# Patient Record
Sex: MALE | Race: WHITE | NOT HISPANIC OR LATINO | Employment: FULL TIME | URBAN - METROPOLITAN AREA
[De-identification: names, ages, dates, MRNs, and addresses within clinical notes are randomized per-mention and may not be internally consistent; named-entity substitution may affect disease eponyms.]

---

## 2023-03-08 PROBLEM — M51.369 LUMBAR DEGENERATIVE DISC DISEASE: Status: ACTIVE | Noted: 2023-03-08

## 2023-03-08 PROBLEM — I10 ESSENTIAL HYPERTENSION: Status: ACTIVE | Noted: 2023-03-08

## 2023-03-08 PROBLEM — R10.9 ABDOMINAL PAIN: Status: ACTIVE | Noted: 2023-03-08

## 2023-03-08 PROBLEM — S13.9XXA NECK SPRAIN: Status: ACTIVE | Noted: 2023-03-08

## 2023-03-08 PROBLEM — I20.9 ANGINA PECTORIS (CMS-HCC): Status: ACTIVE | Noted: 2023-03-08

## 2023-03-08 PROBLEM — N40.0 ENLARGED PROSTATE: Status: ACTIVE | Noted: 2023-03-08

## 2023-03-08 PROBLEM — R07.9 CHEST PAIN: Status: ACTIVE | Noted: 2023-03-08

## 2023-03-08 PROBLEM — M79.10 MYALGIA: Status: ACTIVE | Noted: 2023-03-08

## 2023-03-08 PROBLEM — L40.9 PSORIASIS: Status: ACTIVE | Noted: 2023-03-08

## 2023-03-08 PROBLEM — R73.02 IGT (IMPAIRED GLUCOSE TOLERANCE): Status: ACTIVE | Noted: 2023-03-08

## 2023-03-08 PROBLEM — S46.811A STRAIN OF RIGHT TRAPEZIUS MUSCLE: Status: ACTIVE | Noted: 2023-03-08

## 2023-03-08 PROBLEM — R09.89 LABILE HYPERTENSION: Status: ACTIVE | Noted: 2023-03-08

## 2023-03-08 PROBLEM — G56.01 SEVERE CARPAL TUNNEL SYNDROME OF RIGHT WRIST: Status: ACTIVE | Noted: 2023-03-08

## 2023-03-08 PROBLEM — I70.0 AORTIC ATHEROSCLEROSIS (CMS-HCC): Status: ACTIVE | Noted: 2023-03-08

## 2023-03-08 PROBLEM — M79.7 FIBROMYALGIA: Status: ACTIVE | Noted: 2023-03-08

## 2023-03-08 PROBLEM — I10 PAROXYSMAL HYPERTENSION: Status: ACTIVE | Noted: 2023-03-08

## 2023-03-08 PROBLEM — M54.12 RIGHT CERVICAL RADICULOPATHY: Status: ACTIVE | Noted: 2023-03-08

## 2023-03-08 PROBLEM — S16.1XXA CERVICAL MUSCLE STRAIN, INITIAL ENCOUNTER: Status: ACTIVE | Noted: 2023-03-08

## 2023-03-08 PROBLEM — M19.90 ARTHRITIS: Status: ACTIVE | Noted: 2023-03-08

## 2023-03-08 PROBLEM — H61.23 IMPACTED CERUMEN OF BOTH EARS: Status: ACTIVE | Noted: 2023-03-08

## 2023-03-08 PROBLEM — M77.12 LATERAL EPICONDYLITIS OF LEFT ELBOW: Status: ACTIVE | Noted: 2023-03-08

## 2023-03-08 PROBLEM — R80.9 PROTEINURIA: Status: ACTIVE | Noted: 2023-03-08

## 2023-03-08 PROBLEM — F41.9 ANXIETY: Status: ACTIVE | Noted: 2023-03-08

## 2023-03-08 PROBLEM — L03.011 CELLULITIS OF RIGHT MIDDLE FINGER: Status: ACTIVE | Noted: 2023-03-08

## 2023-03-08 PROBLEM — E55.9 VITAMIN D DEFICIENCY: Status: ACTIVE | Noted: 2023-03-08

## 2023-03-08 PROBLEM — M50.20 CERVICAL HERNIATED DISC: Status: ACTIVE | Noted: 2023-03-08

## 2023-03-08 PROBLEM — K62.5 BRIGHT RED BLOOD PER RECTUM: Status: ACTIVE | Noted: 2023-03-08

## 2023-03-08 PROBLEM — M50.90 CERVICAL DISC DISEASE: Status: ACTIVE | Noted: 2023-03-08

## 2023-03-08 PROBLEM — M47.812 CERVICAL SPONDYLOSIS: Status: ACTIVE | Noted: 2023-03-08

## 2023-03-08 PROBLEM — Z95.1 S/P CABG (CORONARY ARTERY BYPASS GRAFT): Status: ACTIVE | Noted: 2023-03-08

## 2023-03-08 PROBLEM — I25.10 CAD (CORONARY ARTERY DISEASE): Status: ACTIVE | Noted: 2023-03-08

## 2023-03-08 PROBLEM — R20.2 ARM PARESTHESIA, RIGHT: Status: ACTIVE | Noted: 2023-03-08

## 2023-03-08 PROBLEM — R91.8 LUNG NODULES: Status: ACTIVE | Noted: 2023-03-08

## 2023-03-08 PROBLEM — K21.9 GERD (GASTROESOPHAGEAL REFLUX DISEASE): Status: ACTIVE | Noted: 2023-03-08

## 2023-03-08 PROBLEM — H61.22 LEFT EAR IMPACTED CERUMEN: Status: ACTIVE | Noted: 2023-03-08

## 2023-03-08 PROBLEM — R73.9 HYPERGLYCEMIA: Status: ACTIVE | Noted: 2023-03-08

## 2023-03-08 PROBLEM — R79.89 ABNORMAL LFTS: Status: ACTIVE | Noted: 2023-03-08

## 2023-03-08 PROBLEM — M54.50 LOWER BACK PAIN: Status: ACTIVE | Noted: 2023-03-08

## 2023-03-08 PROBLEM — M51.36 LUMBAR DEGENERATIVE DISC DISEASE: Status: ACTIVE | Noted: 2023-03-08

## 2023-03-08 PROBLEM — R53.83 FATIGUE: Status: ACTIVE | Noted: 2023-03-08

## 2023-03-08 PROBLEM — M54.2 NECK PAIN: Status: ACTIVE | Noted: 2023-03-08

## 2023-03-08 PROBLEM — E78.5 HYPERLIPIDEMIA: Status: ACTIVE | Noted: 2023-03-08

## 2023-03-08 PROBLEM — I20.0 UNSTABLE ANGINA (MULTI): Status: ACTIVE | Noted: 2023-03-08

## 2023-03-08 PROBLEM — G47.33 OBSTRUCTIVE SLEEP APNEA: Status: ACTIVE | Noted: 2023-03-08

## 2023-03-08 PROBLEM — M62.838 TRAPEZIUS MUSCLE SPASM: Status: ACTIVE | Noted: 2023-03-08

## 2023-03-08 PROBLEM — M19.90 DJD (DEGENERATIVE JOINT DISEASE): Status: ACTIVE | Noted: 2023-03-08

## 2023-03-08 PROBLEM — M25.519 STERNOCLAVICULAR JOINT PAIN: Status: ACTIVE | Noted: 2023-03-08

## 2023-03-08 PROBLEM — M47.812 FACET ARTHROPATHY, CERVICAL: Status: ACTIVE | Noted: 2023-03-08

## 2023-03-08 PROBLEM — M51.34 THORACIC DEGENERATIVE DISC DISEASE: Status: ACTIVE | Noted: 2023-03-08

## 2023-03-08 PROBLEM — I65.29 CAROTID ARTERY STENOSIS: Status: ACTIVE | Noted: 2023-03-08

## 2023-03-08 PROBLEM — N52.9 ERECTILE DYSFUNCTION: Status: ACTIVE | Noted: 2023-03-08

## 2023-03-08 RX ORDER — ROSUVASTATIN CALCIUM 20 MG/1
1 TABLET, COATED ORAL NIGHTLY
COMMUNITY
Start: 2021-03-13 | End: 2024-05-01

## 2023-03-08 RX ORDER — AMLODIPINE BESYLATE 10 MG/1
1 TABLET ORAL DAILY
COMMUNITY
Start: 2021-03-03 | End: 2023-06-20 | Stop reason: ALTCHOICE

## 2023-03-08 RX ORDER — CLOPIDOGREL BISULFATE 75 MG/1
1 TABLET ORAL DAILY
COMMUNITY
Start: 2020-01-22

## 2023-03-08 RX ORDER — EZETIMIBE 10 MG/1
1 TABLET ORAL NIGHTLY
COMMUNITY
Start: 2020-04-07

## 2023-03-08 RX ORDER — NITROGLYCERIN 0.4 MG/1
TABLET SUBLINGUAL
COMMUNITY
Start: 2020-01-22 | End: 2024-05-22 | Stop reason: SDUPTHER

## 2023-03-08 RX ORDER — LISINOPRIL 40 MG/1
1 TABLET ORAL DAILY
COMMUNITY
Start: 2014-10-29 | End: 2023-11-13 | Stop reason: SDUPTHER

## 2023-03-08 RX ORDER — ALPRAZOLAM 0.25 MG/1
0.5 TABLET ORAL 3 TIMES DAILY PRN
COMMUNITY
Start: 2014-12-05 | End: 2023-05-08 | Stop reason: SDUPTHER

## 2023-03-08 RX ORDER — METOPROLOL SUCCINATE 50 MG/1
50 TABLET, EXTENDED RELEASE ORAL DAILY
COMMUNITY
End: 2023-06-20 | Stop reason: SDUPTHER

## 2023-03-13 ENCOUNTER — OFFICE VISIT (OUTPATIENT)
Dept: PRIMARY CARE | Facility: CLINIC | Age: 63
End: 2023-03-13
Payer: COMMERCIAL

## 2023-03-13 VITALS
RESPIRATION RATE: 16 BRPM | WEIGHT: 150 LBS | SYSTOLIC BLOOD PRESSURE: 160 MMHG | BODY MASS INDEX: 22.81 KG/M2 | HEART RATE: 58 BPM | DIASTOLIC BLOOD PRESSURE: 88 MMHG

## 2023-03-13 DIAGNOSIS — E78.2 MIXED HYPERLIPIDEMIA: ICD-10-CM

## 2023-03-13 DIAGNOSIS — I65.01 STENOSIS OF RIGHT VERTEBRAL ARTERY: ICD-10-CM

## 2023-03-13 DIAGNOSIS — I25.10 CORONARY ARTERY DISEASE INVOLVING NATIVE CORONARY ARTERY OF NATIVE HEART WITHOUT ANGINA PECTORIS: ICD-10-CM

## 2023-03-13 DIAGNOSIS — R73.02 IGT (IMPAIRED GLUCOSE TOLERANCE): ICD-10-CM

## 2023-03-13 DIAGNOSIS — I10 ESSENTIAL HYPERTENSION: Primary | ICD-10-CM

## 2023-03-13 DIAGNOSIS — F41.9 ANXIETY: ICD-10-CM

## 2023-03-13 DIAGNOSIS — L40.9 PSORIASIS: ICD-10-CM

## 2023-03-13 LAB
APPEARANCE, URINE: ABNORMAL
BILIRUBIN, URINE: NEGATIVE
BLOOD, URINE: NEGATIVE
COLOR, URINE: YELLOW
GLUCOSE, URINE: NEGATIVE MG/DL
KETONES, URINE: NEGATIVE MG/DL
LEUKOCYTE ESTERASE, URINE: NEGATIVE
MUCUS, URINE: NORMAL /LPF
NITRITE, URINE: NEGATIVE
PH, URINE: 6 (ref 5–8)
PROTEIN, URINE: ABNORMAL MG/DL
RBC, URINE: 2 /HPF (ref 0–5)
SPECIFIC GRAVITY, URINE: 1.01 (ref 1–1.03)
SQUAMOUS EPITHELIAL CELLS, URINE: <1 /HPF
UROBILINOGEN, URINE: <2 MG/DL (ref 0–1.9)
WBC, URINE: <1 /HPF (ref 0–5)

## 2023-03-13 PROCEDURE — 81001 URINALYSIS AUTO W/SCOPE: CPT

## 2023-03-13 PROCEDURE — 3077F SYST BP >= 140 MM HG: CPT | Performed by: INTERNAL MEDICINE

## 2023-03-13 PROCEDURE — 99214 OFFICE O/P EST MOD 30 MIN: CPT | Performed by: INTERNAL MEDICINE

## 2023-03-13 PROCEDURE — 3079F DIAST BP 80-89 MM HG: CPT | Performed by: INTERNAL MEDICINE

## 2023-03-13 RX ORDER — HYDROCHLOROTHIAZIDE 25 MG/1
25 TABLET ORAL DAILY
Qty: 30 TABLET | Refills: 11 | Status: SHIPPED | OUTPATIENT
Start: 2023-03-13 | End: 2024-05-01 | Stop reason: HOSPADM

## 2023-03-13 ASSESSMENT — ENCOUNTER SYMPTOMS
NEUROLOGICAL NEGATIVE: 1
RESPIRATORY NEGATIVE: 1
CONSTITUTIONAL NEGATIVE: 1
EYES NEGATIVE: 1
MUSCULOSKELETAL NEGATIVE: 1
CARDIOVASCULAR NEGATIVE: 1
GASTROINTESTINAL NEGATIVE: 1
NERVOUS/ANXIOUS: 1

## 2023-03-13 NOTE — PROGRESS NOTES
Subjective   Patient ID: Kennedy Jama is a 63 y.o. male who presents for No chief complaint on file..    HPI     OARRS:  Matheus Mcpherson MD on 3/13/2023 10:43 AM  I have personally reviewed the OARRS report for Kennedy Jama. I have considered the risks of abuse, dependence, addiction and diversion    Is the patient prescribed a combination of a benzodiazepine and opioid?  No    Last Urine Drug Screen / ordered today: Yes  Recent Results (from the past 06776 hour(s))   Drug Screen, Urine With Reflex to Confirmation    Collection Time: 01/13/22  3:42 PM   Result Value Ref Range    DRUG SCREEN COMMENT URINE SEE BELOW     Amphetamine Screen, Urine PRESUMPTIVE NEGATIVE NEGATIVE    Barbiturate Screen, Urine PRESUMPTIVE NEGATIVE NEGATIVE    BENZODIAZEPINE (PRESENCE) IN URINE BY SCREEN METHOD PRESUMPTIVE NEGATIVE NEGATIVE    Cannabinoid Screen, Urine PRESUMPTIVE NEGATIVE NEGATIVE    Cocaine Screen, Urine PRESUMPTIVE NEGATIVE NEGATIVE    Fentanyl, Ur PRESUMPTIVE NEGATIVE NEGATIVE    Methadone Screen, Urine PRESUMPTIVE NEGATIVE NEGATIVE    Opiate Screen, Urine PRESUMPTIVE NEGATIVE NEGATIVE    Oxycodone Screen, Ur PRESUMPTIVE NEGATIVE NEGATIVE    PCP Screen, Urine PRESUMPTIVE NEGATIVE NEGATIVE     Results are as expected.     Controlled Substance Agreement:  Date of the Last Agreement: 3/13/23  Reviewed Controlled Substance Agreement including but not limited to the benefits, risks, and alternatives to treatment with a Controlled Substance medication(s).    Benzodiazepines:  What is the patient's goal of therapy? Control anxiety  Is this being achieved with current treatment? yes    RAMON-7:  No data recorded    Activities of Daily Living:   Is your overall impression that this patient is benefiting (symptom reduction outweighs side effects) from benzodiazepine therapy? Yes     1. Physical Functioning: Same  2. Family Relationship: Same  3. Social Relationship: Same  4. Mood: Same  5. Sleep Patterns: Same  6. Overall  Function: Same    Review of Systems   Constitutional: Negative.    HENT: Negative.     Eyes: Negative.    Respiratory: Negative.     Cardiovascular: Negative.    Gastrointestinal: Negative.    Genitourinary: Negative.    Musculoskeletal: Negative.    Skin: Negative.    Neurological: Negative.    Psychiatric/Behavioral:  The patient is nervous/anxious.        Objective   /88   Pulse 58   Resp 16   Wt 68 kg (150 lb)   BMI 22.81 kg/m²     Physical Exam  Constitutional:       Appearance: Normal appearance.   HENT:      Head: Normocephalic.      Nose: Nose normal.   Cardiovascular:      Rate and Rhythm: Normal rate and regular rhythm.      Pulses: Normal pulses.      Heart sounds: Normal heart sounds.   Pulmonary:      Effort: Pulmonary effort is normal.      Breath sounds: Normal breath sounds.   Abdominal:      Palpations: Abdomen is soft.   Musculoskeletal:         General: Normal range of motion.      Cervical back: Normal range of motion.      Right lower leg: No edema.      Left lower leg: No edema.   Skin:     General: Skin is warm and dry.   Neurological:      General: No focal deficit present.      Mental Status: He is alert and oriented to person, place, and time. Mental status is at baseline.   Psychiatric:         Mood and Affect: Mood normal.         Behavior: Behavior normal.         Thought Content: Thought content normal.         Judgment: Judgment normal.         Assessment/Plan   Diagnoses and all orders for this visit:  Essential hypertension  -     Basic Metabolic Panel; Future  -     CBC; Future  -     Urinalysis with Reflex Microscopic; Future  -     hydroCHLOROthiazide (HYDRODiuril) 25 mg tablet; Take 1 tablet (25 mg) by mouth once daily.  Anxiety  -     Drug Screen, Urine With Reflex to Confirmation; Future  IGT (impaired glucose tolerance)  -     Hemoglobin A1C; Future  -     Urinalysis with Reflex Microscopic  Mixed hyperlipidemia  -     Alanine Aminotransferase; Future  -      Aspartate Aminotransferase; Future  -     Lipid Panel; Future  Coronary artery disease involving native coronary artery of native heart without angina pectoris  Psoriasis  -     Referral to Dermatology; Future  Stenosis of right vertebral artery  -     Referral to Vascular Surgery; Future

## 2023-03-14 NOTE — PROGRESS NOTES
HPI    HTN, dyslipidemia, IGT, anxiety, CAD post CABG.  Comes in f/u.  Due for labs.  Needs drug screen & contract done.  He had a carotid U/S done w/cardiology in November.  Carotids were clear, there was some narrowing at the bottom of the R vertebral artery, asymptomatic.  He's got psoriasis R knee & L elbow & wanted to see dermatology.  Occasionally very subtle L flank pain, pretty minor.  Bowels are fine, urination OK.  No SOB or chest pain.  Otherwise feels fine w/out any real issues or concerns.  He had stress testing done w/his cardiologist that came out fine in November as well.  Sxs & conditions moderate in severity, stable controlled over the past 4 months.     Assessessment/Plan  Diagnoses and all orders for this visit:  Essential hypertension  -     Basic Metabolic Panel; Future  -     CBC; Future  -     Urinalysis with Reflex Microscopic; Future  -     hydroCHLOROthiazide (HYDRODiuril) 25 mg tablet; Take 1 tablet (25 mg) by mouth once daily.  Anxiety  -     Drug Screen, Urine With Reflex to Confirmation; Future  IGT (impaired glucose tolerance)  -     Hemoglobin A1C; Future  -     Urinalysis with Reflex Microscopic  -     Urinalysis Microscopic Only  Mixed hyperlipidemia  -     Alanine Aminotransferase; Future  -     Aspartate Aminotransferase; Future  -     Lipid Panel; Future  Coronary artery disease involving native coronary artery of native heart without angina pectoris  Psoriasis  -     Referral to Dermatology; Future  Stenosis of right vertebral artery  -     Referral to Vascular Surgery; Future     HTN, dyslipidemia, IGT, anxiety, CAD post CABG.  Medically doing fine.  I had him sign a contract for Xanax.  We'll do drug screen pain management protocol.   He has enough of the Xanax, contact me when he needs a refill.  BP above goal, we'll add HCTZ 25mg daily.  We'll check his labs, Hb A1C & lipids.  We'll get a UA w/that little bit of flank pain, pretty minor, so long as the urine is clear I  would just observe.  If there's blood in the urine then we'll work it up. We'll have him see dermatology w/the psoriasis.  We'll have him see vascular w/this questionable R vertebral artery narrowing.  I'll see him in 3 months for a physical.  Continue care w/cardiology.      Scribe Attestation  By signing my name below, I, Burak Ahmadi   attest that this documentation has been prepared under the direction and in the presence of Matheus Mcpherson MD.

## 2023-04-26 ENCOUNTER — TELEPHONE (OUTPATIENT)
Dept: PRIMARY CARE | Facility: CLINIC | Age: 63
End: 2023-04-26
Payer: COMMERCIAL

## 2023-05-08 ENCOUNTER — TELEPHONE (OUTPATIENT)
Dept: PRIMARY CARE | Facility: CLINIC | Age: 63
End: 2023-05-08
Payer: COMMERCIAL

## 2023-05-08 DIAGNOSIS — F41.9 ANXIETY: ICD-10-CM

## 2023-05-08 NOTE — TELEPHONE ENCOUNTER
Patient left another vm today, regarding his Xanax.   LOV:3/13/23  Contract:3/13/23  Drug Screen:3/13/23  OARRS:10/28/22

## 2023-05-09 RX ORDER — ALPRAZOLAM 0.25 MG/1
0.12 TABLET ORAL 3 TIMES DAILY PRN
Qty: 42 TABLET | Refills: 0 | Status: SHIPPED | OUTPATIENT
Start: 2023-05-09

## 2023-06-20 ENCOUNTER — OFFICE VISIT (OUTPATIENT)
Dept: PRIMARY CARE | Facility: CLINIC | Age: 63
End: 2023-06-20
Payer: COMMERCIAL

## 2023-06-20 ENCOUNTER — APPOINTMENT (OUTPATIENT)
Dept: PRIMARY CARE | Facility: CLINIC | Age: 63
End: 2023-06-20
Payer: COMMERCIAL

## 2023-06-20 ENCOUNTER — LAB (OUTPATIENT)
Dept: LAB | Facility: LAB | Age: 63
End: 2023-06-20
Payer: COMMERCIAL

## 2023-06-20 VITALS
HEIGHT: 68 IN | WEIGHT: 153.4 LBS | HEART RATE: 57 BPM | SYSTOLIC BLOOD PRESSURE: 129 MMHG | RESPIRATION RATE: 16 BRPM | BODY MASS INDEX: 23.25 KG/M2 | DIASTOLIC BLOOD PRESSURE: 76 MMHG | OXYGEN SATURATION: 97 %

## 2023-06-20 DIAGNOSIS — Z12.5 PROSTATE CANCER SCREENING: ICD-10-CM

## 2023-06-20 DIAGNOSIS — R73.9 HYPERGLYCEMIA: ICD-10-CM

## 2023-06-20 DIAGNOSIS — I25.10 CORONARY ARTERY DISEASE INVOLVING NATIVE CORONARY ARTERY OF NATIVE HEART, UNSPECIFIED WHETHER ANGINA PRESENT: ICD-10-CM

## 2023-06-20 DIAGNOSIS — R80.9 PROTEINURIA, UNSPECIFIED TYPE: ICD-10-CM

## 2023-06-20 DIAGNOSIS — I10 ESSENTIAL HYPERTENSION: ICD-10-CM

## 2023-06-20 DIAGNOSIS — Z12.11 COLON CANCER SCREENING: ICD-10-CM

## 2023-06-20 DIAGNOSIS — Z00.00 ANNUAL PHYSICAL EXAM: Primary | ICD-10-CM

## 2023-06-20 DIAGNOSIS — G47.00 INSOMNIA, UNSPECIFIED TYPE: ICD-10-CM

## 2023-06-20 DIAGNOSIS — E78.5 HYPERLIPIDEMIA, UNSPECIFIED HYPERLIPIDEMIA TYPE: ICD-10-CM

## 2023-06-20 DIAGNOSIS — Z23 ENCOUNTER FOR IMMUNIZATION: ICD-10-CM

## 2023-06-20 DIAGNOSIS — F41.9 ANXIETY: ICD-10-CM

## 2023-06-20 PROBLEM — M62.838 TRAPEZIUS MUSCLE SPASM: Status: RESOLVED | Noted: 2023-03-08 | Resolved: 2023-06-20

## 2023-06-20 PROBLEM — M19.90 DJD (DEGENERATIVE JOINT DISEASE): Status: RESOLVED | Noted: 2023-03-08 | Resolved: 2023-06-20

## 2023-06-20 PROBLEM — S46.811A STRAIN OF RIGHT TRAPEZIUS MUSCLE: Status: RESOLVED | Noted: 2023-03-08 | Resolved: 2023-06-20

## 2023-06-20 PROBLEM — R07.9 CHEST PAIN: Status: RESOLVED | Noted: 2023-03-08 | Resolved: 2023-06-20

## 2023-06-20 PROBLEM — M79.10 MYALGIA: Status: RESOLVED | Noted: 2023-03-08 | Resolved: 2023-06-20

## 2023-06-20 PROBLEM — K62.5 BRIGHT RED BLOOD PER RECTUM: Status: RESOLVED | Noted: 2023-03-08 | Resolved: 2023-06-20

## 2023-06-20 PROBLEM — S16.1XXA CERVICAL MUSCLE STRAIN, INITIAL ENCOUNTER: Status: RESOLVED | Noted: 2023-03-08 | Resolved: 2023-06-20

## 2023-06-20 PROBLEM — H61.22 LEFT EAR IMPACTED CERUMEN: Status: RESOLVED | Noted: 2023-03-08 | Resolved: 2023-06-20

## 2023-06-20 PROBLEM — S13.9XXA NECK SPRAIN: Status: RESOLVED | Noted: 2023-03-08 | Resolved: 2023-06-20

## 2023-06-20 PROBLEM — L03.011 CELLULITIS OF RIGHT MIDDLE FINGER: Status: RESOLVED | Noted: 2023-03-08 | Resolved: 2023-06-20

## 2023-06-20 PROBLEM — M77.12 LATERAL EPICONDYLITIS OF LEFT ELBOW: Status: RESOLVED | Noted: 2023-03-08 | Resolved: 2023-06-20

## 2023-06-20 PROBLEM — H61.23 IMPACTED CERUMEN OF BOTH EARS: Status: RESOLVED | Noted: 2023-03-08 | Resolved: 2023-06-20

## 2023-06-20 PROBLEM — M54.50 LOWER BACK PAIN: Status: RESOLVED | Noted: 2023-03-08 | Resolved: 2023-06-20

## 2023-06-20 PROBLEM — R73.02 IGT (IMPAIRED GLUCOSE TOLERANCE): Status: RESOLVED | Noted: 2023-03-08 | Resolved: 2023-06-20

## 2023-06-20 PROBLEM — M50.90 CERVICAL DISC DISEASE: Status: RESOLVED | Noted: 2023-03-08 | Resolved: 2023-06-20

## 2023-06-20 PROBLEM — R10.9 ABDOMINAL PAIN: Status: RESOLVED | Noted: 2023-03-08 | Resolved: 2023-06-20

## 2023-06-20 LAB
ALANINE AMINOTRANSFERASE (SGPT) (U/L) IN SER/PLAS: 32 U/L (ref 10–52)
ALBUMIN (MG/L) IN URINE: 106.2 MG/L
ALBUMIN/CREATININE (UG/MG) IN URINE: 293.4 UG/MG CRT (ref 0–30)
ANION GAP IN SER/PLAS: 15 MMOL/L (ref 10–20)
ASPARTATE AMINOTRANSFERASE (SGOT) (U/L) IN SER/PLAS: 24 U/L (ref 9–39)
CALCIUM (MG/DL) IN SER/PLAS: 10.4 MG/DL (ref 8.6–10.6)
CARBON DIOXIDE, TOTAL (MMOL/L) IN SER/PLAS: 28 MMOL/L (ref 21–32)
CHLORIDE (MMOL/L) IN SER/PLAS: 100 MMOL/L (ref 98–107)
CHOLESTEROL (MG/DL) IN SER/PLAS: 129 MG/DL (ref 0–199)
CHOLESTEROL IN HDL (MG/DL) IN SER/PLAS: 39.7 MG/DL
CHOLESTEROL/HDL RATIO: 3.2
CREATININE (MG/DL) IN SER/PLAS: 1.26 MG/DL (ref 0.5–1.3)
CREATININE (MG/DL) IN URINE: 36.2 MG/DL (ref 20–370)
ERYTHROCYTE DISTRIBUTION WIDTH (RATIO) BY AUTOMATED COUNT: 12.5 % (ref 11.5–14.5)
ERYTHROCYTE MEAN CORPUSCULAR HEMOGLOBIN CONCENTRATION (G/DL) BY AUTOMATED: 34.3 G/DL (ref 32–36)
ERYTHROCYTE MEAN CORPUSCULAR VOLUME (FL) BY AUTOMATED COUNT: 83 FL (ref 80–100)
ERYTHROCYTES (10*6/UL) IN BLOOD BY AUTOMATED COUNT: 5.56 X10E12/L (ref 4.5–5.9)
ESTIMATED AVERAGE GLUCOSE FOR HBA1C: 120 MG/DL
GFR MALE: 64 ML/MIN/1.73M2
GLUCOSE (MG/DL) IN SER/PLAS: 109 MG/DL (ref 74–99)
HEMATOCRIT (%) IN BLOOD BY AUTOMATED COUNT: 46.1 % (ref 41–52)
HEMOGLOBIN (G/DL) IN BLOOD: 15.8 G/DL (ref 13.5–17.5)
HEMOGLOBIN A1C/HEMOGLOBIN TOTAL IN BLOOD: 5.8 %
LDL: 68 MG/DL (ref 0–99)
LEUKOCYTES (10*3/UL) IN BLOOD BY AUTOMATED COUNT: 8.1 X10E9/L (ref 4.4–11.3)
NRBC (PER 100 WBCS) BY AUTOMATED COUNT: 0 /100 WBC (ref 0–0)
PLATELETS (10*3/UL) IN BLOOD AUTOMATED COUNT: 183 X10E9/L (ref 150–450)
POTASSIUM (MMOL/L) IN SER/PLAS: 4.1 MMOL/L (ref 3.5–5.3)
PROSTATE SPECIFIC ANTIGEN,SCREEN: 0.58 NG/ML (ref 0–4)
SODIUM (MMOL/L) IN SER/PLAS: 139 MMOL/L (ref 136–145)
TRIGLYCERIDE (MG/DL) IN SER/PLAS: 106 MG/DL (ref 0–149)
UREA NITROGEN (MG/DL) IN SER/PLAS: 29 MG/DL (ref 6–23)
VLDL: 21 MG/DL (ref 0–40)

## 2023-06-20 PROCEDURE — 3074F SYST BP LT 130 MM HG: CPT | Performed by: FAMILY MEDICINE

## 2023-06-20 PROCEDURE — 84450 TRANSFERASE (AST) (SGOT): CPT

## 2023-06-20 PROCEDURE — 90750 HZV VACC RECOMBINANT IM: CPT | Performed by: FAMILY MEDICINE

## 2023-06-20 PROCEDURE — 3078F DIAST BP <80 MM HG: CPT | Performed by: FAMILY MEDICINE

## 2023-06-20 PROCEDURE — 85027 COMPLETE CBC AUTOMATED: CPT

## 2023-06-20 PROCEDURE — 82043 UR ALBUMIN QUANTITATIVE: CPT

## 2023-06-20 PROCEDURE — 80061 LIPID PANEL: CPT

## 2023-06-20 PROCEDURE — 84153 ASSAY OF PSA TOTAL: CPT

## 2023-06-20 PROCEDURE — 83036 HEMOGLOBIN GLYCOSYLATED A1C: CPT

## 2023-06-20 PROCEDURE — 99396 PREV VISIT EST AGE 40-64: CPT | Performed by: FAMILY MEDICINE

## 2023-06-20 PROCEDURE — 36415 COLL VENOUS BLD VENIPUNCTURE: CPT

## 2023-06-20 PROCEDURE — 84460 ALANINE AMINO (ALT) (SGPT): CPT

## 2023-06-20 PROCEDURE — 1036F TOBACCO NON-USER: CPT | Performed by: FAMILY MEDICINE

## 2023-06-20 PROCEDURE — 82570 ASSAY OF URINE CREATININE: CPT

## 2023-06-20 PROCEDURE — 80048 BASIC METABOLIC PNL TOTAL CA: CPT

## 2023-06-20 PROCEDURE — 90471 IMMUNIZATION ADMIN: CPT | Performed by: FAMILY MEDICINE

## 2023-06-20 RX ORDER — METOPROLOL SUCCINATE 50 MG/1
50 TABLET, EXTENDED RELEASE ORAL DAILY
Qty: 90 TABLET | Refills: 1 | Status: SHIPPED | OUTPATIENT
Start: 2023-06-20 | End: 2024-03-21 | Stop reason: SDUPTHER

## 2023-06-20 ASSESSMENT — PATIENT HEALTH QUESTIONNAIRE - PHQ9
SUM OF ALL RESPONSES TO PHQ9 QUESTIONS 1 AND 2: 0
2. FEELING DOWN, DEPRESSED OR HOPELESS: NOT AT ALL
1. LITTLE INTEREST OR PLEASURE IN DOING THINGS: NOT AT ALL

## 2023-06-20 NOTE — PATIENT INSTRUCTIONS
Fasting labs.  Colonoscopy referral.  Refilled Metoprolol.  Did not refill Alprazolam.  Referred to psychiatry for anxiety and sleep medicine for insomnia.  Follow up with cardiology as directed.  Shingrix #1 provided (return in 2-6 months for shingrix #2).    F/U 6 months: Med refills, Shingrix #2 if not yet provided.

## 2023-06-20 NOTE — PROGRESS NOTES
"Subjective   Patient ID: Kennedy Jama is a 63 y.o. male who presents for Annual Exam.    HPI   Initial visit.    Patient's health is described as alright .  Regular dental visits: Yes.  Dental hygiene (brushing/flossing) regularly performed Yes.  Vision problems: Yes.  Corrective lenses: Yes.  Last eye exam within 1 year: No.  Hearing loss: No.  Requests audiology referral: No.  Immunizations up to date: No.  Healthy diet: Yes.  Regular exercise: No (active at work).  Trying to lose weight: No.  Requests nutrition/weight loss referral: No.  Sexually active: No.  Using contraception: N/A.  Requests STD screening: No.  Colon cancer screening up to date: No.    H/O CAD.  Prior PCP provided Metoprolol.  Requests refill.  Other meds for CAD provided by cardiology.    H/O Anxiety, Insomnia.  Takes Xanax during the day for anxiety, and at night for insomnia.  Xanax provided by prior PCP.  Not seeing psych.  Has not tried anything else for sleep.    Review of Systems   All other systems reviewed and are negative.    Objective   /76   Pulse 57   Resp 16   Ht 1.727 m (5' 8\")   Wt 69.6 kg (153 lb 6.4 oz)   SpO2 97%   BMI 23.32 kg/m²     Physical Exam  Constitutional:       General: He is not in acute distress.     Appearance: He is normal weight.   HENT:      Head: Normocephalic.      Right Ear: Tympanic membrane normal.      Left Ear: Tympanic membrane normal.      Mouth/Throat:      Pharynx: Oropharynx is clear. No oropharyngeal exudate or posterior oropharyngeal erythema.   Eyes:      Extraocular Movements: Extraocular movements intact.      Conjunctiva/sclera: Conjunctivae normal.      Pupils: Pupils are equal, round, and reactive to light.   Neck:      Thyroid: No thyromegaly.      Vascular: No carotid bruit.   Cardiovascular:      Rate and Rhythm: Normal rate and regular rhythm.      Heart sounds: Normal heart sounds. No murmur heard.     No friction rub. No gallop.   Pulmonary:      Effort: Pulmonary " effort is normal.      Breath sounds: Normal breath sounds. No wheezing, rhonchi or rales.   Abdominal:      General: Bowel sounds are normal. There is no distension.      Palpations: Abdomen is soft. There is no mass.      Tenderness: There is no abdominal tenderness. There is no guarding or rebound.   Genitourinary:     Comments: Declined prostate exam.  Lymphadenopathy:      Cervical: No cervical adenopathy.   Skin:     Coloration: Skin is not jaundiced or pale.   Neurological:      General: No focal deficit present.      Mental Status: He is oriented to person, place, and time.   Psychiatric:         Mood and Affect: Mood normal.         Behavior: Behavior normal.     Assessment/Plan   Diagnoses and all orders for this visit:  Annual physical exam  Encounter for immunization  -     Zoster vaccine, recombinant, adult (SHINGRIX)  Coronary artery disease involving native coronary artery of native heart, unspecified whether angina present  -     Lipid Panel; Future  -     Alanine Aminotransferase; Future  -     Aspartate Aminotransferase; Future  -     metoprolol succinate XL (Toprol-XL) 50 mg 24 hr tablet; Take 1 tablet (50 mg) by mouth once daily. For CAD  -     Tx by cardiology (provides Plavix)  Essential hypertension  -     CBC; Future  -     Basic Metabolic Panel; Future  -     Tx by cardiology (provides Lisinopril)  Anxiety  -     Referral to Psychiatry; Future  Insomnia, unspecified type  -     Referral to Adult Sleep Medicine; Future  Hyperglycemia  -     Hemoglobin A1C; Future  Hyperlipidemia, unspecified hyperlipidemia type  -     Lipid Panel; Future  -     Alanine Aminotransferase; Future  -     Aspartate Aminotransferase; Future  -     Tx by cardiology (provides Crestor, Zetia)  Proteinuria, unspecified type  -     Albumin , Urine Random; Future  Prostate cancer screening  -     Prostate Specific Antigen, Screen; Future  Colon cancer screening  -     Colonoscopy; Future    Fasting labs.  Colonoscopy  referral.  Refilled Metoprolol.  Did not refill Alprazolam.  Referred to psychiatry for anxiety and sleep medicine for insomnia.  Follow up with cardiology as directed.  Shingrix #1 provided (return in 2-6 months for shingrix #2).    F/U 6 months: Med refills, Shingrix #2 if not yet provided.

## 2023-07-04 DIAGNOSIS — R73.03 PREDIABETES: ICD-10-CM

## 2023-07-04 DIAGNOSIS — R80.9 MICROALBUMINURIA: Primary | ICD-10-CM

## 2023-11-13 DIAGNOSIS — I10 HYPERTENSION: Primary | ICD-10-CM

## 2023-11-13 RX ORDER — LISINOPRIL 40 MG/1
40 TABLET ORAL DAILY
Qty: 90 TABLET | Refills: 3 | Status: SHIPPED | OUTPATIENT
Start: 2023-11-13 | End: 2024-11-12

## 2023-12-19 ENCOUNTER — OFFICE VISIT (OUTPATIENT)
Dept: CARDIOLOGY | Facility: HOSPITAL | Age: 63
End: 2023-12-19
Payer: COMMERCIAL

## 2023-12-19 VITALS
HEIGHT: 68 IN | BODY MASS INDEX: 23.64 KG/M2 | HEART RATE: 56 BPM | SYSTOLIC BLOOD PRESSURE: 135 MMHG | DIASTOLIC BLOOD PRESSURE: 70 MMHG | WEIGHT: 156 LBS

## 2023-12-19 DIAGNOSIS — I25.10 CORONARY ARTERY DISEASE INVOLVING NATIVE CORONARY ARTERY OF NATIVE HEART, UNSPECIFIED WHETHER ANGINA PRESENT: Primary | ICD-10-CM

## 2023-12-19 PROCEDURE — 93005 ELECTROCARDIOGRAM TRACING: CPT | Performed by: INTERNAL MEDICINE

## 2023-12-19 PROCEDURE — 3075F SYST BP GE 130 - 139MM HG: CPT | Performed by: INTERNAL MEDICINE

## 2023-12-19 PROCEDURE — 99214 OFFICE O/P EST MOD 30 MIN: CPT | Performed by: INTERNAL MEDICINE

## 2023-12-19 PROCEDURE — 93010 ELECTROCARDIOGRAM REPORT: CPT | Performed by: INTERNAL MEDICINE

## 2023-12-19 PROCEDURE — 99214 OFFICE O/P EST MOD 30 MIN: CPT | Mod: 25 | Performed by: INTERNAL MEDICINE

## 2023-12-19 PROCEDURE — 3078F DIAST BP <80 MM HG: CPT | Performed by: INTERNAL MEDICINE

## 2023-12-19 PROCEDURE — 1036F TOBACCO NON-USER: CPT | Performed by: INTERNAL MEDICINE

## 2023-12-19 ASSESSMENT — ENCOUNTER SYMPTOMS
LOSS OF SENSATION IN FEET: 0
OCCASIONAL FEELINGS OF UNSTEADINESS: 0
DEPRESSION: 0

## 2023-12-19 NOTE — PROGRESS NOTES
Subjective:  Patient returns for a routine follow-up.  He is a pleasant gentleman with extensive coronary disease status post CABG.  He also has had multiple stent procedures on multiple vein grafts.  He generally appears to be doing reasonably well but is concerned as he feels he may be getting somewhat more exertional dyspnea and chest discomfort.  He does appear to be getting several episodes a week.  He appears somewhat anxious about this.  He has not had any hospitalizations and denies any other new health concerns.  He is taking all of his medications compliantly and is tolerating them well.    Objective:  General: Alert, usual pleasant self.  HEENT: Unchanged.  Lungs: Clear without crackles or wheezing.  Cardiac: Normal S1 and S2 with faint systolic murmur.  Abdomen: Nontender.  Extremities: No edema.  Skin: No rash.  Neuro: Grossly intact.    EKG: Normal sinus rhythm.  Inferior infarct.  Slow R wave progression.  No acute changes.    Lipid panel: Cholesterol-129, HDL-40, LDL-68, TG-106.    Impression/plan:  Patient presents with some potentially increasing anginal type symptomatology.  He is appropriately concerned about this.  His heart rate and blood pressure remain under very good control.  He remains compliant with his dual antiplatelet therapy.  His lipid panel looks quite good on combination therapy.  I elected to continue his current medications unchanged.  I will plan on getting him set up for a regular stress test after the first of the year and will compare this to his prior studies to see if there is any evidence of progressive ischemic disease.  Should this be the case, I do think we will be compelled to jump into a repeat catheterization and potential intervention.  He knows to call for any abrupt escalation of his symptomatology.  He did not need any prescriptions renewed.    Patient instructions:    Continue current medications unchanged.    Report for your stress test in January when  scheduled.    Return to clinic in 3 months.

## 2023-12-20 LAB
ATRIAL RATE: 56 BPM
P AXIS: 37 DEGREES
P OFFSET: 197 MS
P ONSET: 145 MS
PR INTERVAL: 148 MS
Q ONSET: 219 MS
QRS COUNT: 10 BEATS
QRS DURATION: 86 MS
QT INTERVAL: 442 MS
QTC CALCULATION(BAZETT): 426 MS
QTC FREDERICIA: 432 MS
R AXIS: -33 DEGREES
T AXIS: 29 DEGREES
T OFFSET: 440 MS
VENTRICULAR RATE: 56 BPM

## 2024-03-12 DIAGNOSIS — I10 ESSENTIAL HYPERTENSION: Primary | ICD-10-CM

## 2024-03-13 ENCOUNTER — APPOINTMENT (OUTPATIENT)
Dept: CARDIOLOGY | Facility: HOSPITAL | Age: 64
End: 2024-03-13
Payer: COMMERCIAL

## 2024-03-15 DIAGNOSIS — I10 ESSENTIAL HYPERTENSION: Primary | ICD-10-CM

## 2024-03-15 RX ORDER — CHLORTHALIDONE 25 MG/1
25 TABLET ORAL DAILY
Qty: 90 TABLET | Refills: 3 | Status: SHIPPED | OUTPATIENT
Start: 2024-03-15 | End: 2024-03-15 | Stop reason: SDUPTHER

## 2024-03-15 RX ORDER — CHLORTHALIDONE 25 MG/1
25 TABLET ORAL DAILY
Qty: 30 TABLET | Refills: 0 | Status: SHIPPED | OUTPATIENT
Start: 2024-03-15 | End: 2024-05-01 | Stop reason: HOSPADM

## 2024-03-21 ENCOUNTER — OFFICE VISIT (OUTPATIENT)
Dept: PRIMARY CARE | Facility: CLINIC | Age: 64
End: 2024-03-21
Payer: COMMERCIAL

## 2024-03-21 VITALS
SYSTOLIC BLOOD PRESSURE: 128 MMHG | DIASTOLIC BLOOD PRESSURE: 70 MMHG | BODY MASS INDEX: 23.76 KG/M2 | TEMPERATURE: 97.3 F | HEART RATE: 57 BPM | OXYGEN SATURATION: 97 % | WEIGHT: 156.8 LBS | HEIGHT: 68 IN | RESPIRATION RATE: 16 BRPM

## 2024-03-21 DIAGNOSIS — I25.10 CORONARY ARTERY DISEASE INVOLVING NATIVE CORONARY ARTERY OF NATIVE HEART, UNSPECIFIED WHETHER ANGINA PRESENT: Primary | ICD-10-CM

## 2024-03-21 DIAGNOSIS — Z23 ENCOUNTER FOR IMMUNIZATION: ICD-10-CM

## 2024-03-21 PROCEDURE — 90750 HZV VACC RECOMBINANT IM: CPT | Performed by: FAMILY MEDICINE

## 2024-03-21 PROCEDURE — 90471 IMMUNIZATION ADMIN: CPT | Performed by: FAMILY MEDICINE

## 2024-03-21 PROCEDURE — 3074F SYST BP LT 130 MM HG: CPT | Performed by: FAMILY MEDICINE

## 2024-03-21 PROCEDURE — 99214 OFFICE O/P EST MOD 30 MIN: CPT | Performed by: FAMILY MEDICINE

## 2024-03-21 PROCEDURE — 3078F DIAST BP <80 MM HG: CPT | Performed by: FAMILY MEDICINE

## 2024-03-21 PROCEDURE — 1036F TOBACCO NON-USER: CPT | Performed by: FAMILY MEDICINE

## 2024-03-21 RX ORDER — METOPROLOL SUCCINATE 50 MG/1
50 TABLET, EXTENDED RELEASE ORAL DAILY
Qty: 90 TABLET | Refills: 0 | Status: SHIPPED | OUTPATIENT
Start: 2024-03-21 | End: 2024-06-07 | Stop reason: SDUPTHER

## 2024-03-21 NOTE — PROGRESS NOTES
"Subjective   Patient ID: Kennedy Jama is a 64 y.o. male who presents for Med Refill.    HPI   H/O CAD.  Requests refill of Metoprolol (last Rx 6/20/23, 90 tabs w/1 refill).  States he is taking as directed despite refill pattern.  Other meds for CAD provided by cardiology.     Review of Systems  No other complaints.     Objective   /70   Pulse 57   Temp 36.3 °C (97.3 °F)   Resp 16   Ht 1.727 m (5' 8\")   Wt 71.1 kg (156 lb 12.8 oz)   SpO2 97%   BMI 23.84 kg/m²     Physical Exam  Constitutional:       General: He is not in acute distress.     Appearance: He is normal weight.   Cardiovascular:      Rate and Rhythm: Regular rhythm. Bradycardia present.      Heart sounds: Normal heart sounds. No murmur heard.     No friction rub. No gallop.   Pulmonary:      Effort: Pulmonary effort is normal.      Breath sounds: Normal breath sounds. No wheezing, rhonchi or rales.   Neurological:      Mental Status: He is oriented to person, place, and time.   Psychiatric:         Mood and Affect: Mood normal.         Behavior: Behavior normal.     Assessment/Plan   Diagnoses and all orders for this visit:  Coronary artery disease involving native coronary artery of native heart, unspecified whether angina present  -     metoprolol succinate XL (Toprol-XL) 50 mg 24 hr tablet; Take 1 tablet (50 mg) by mouth once daily. For CAD  Encounter for immunization  -     Zoster vaccine, recombinant, adult (SHINGRIX)    Refilled medication.  Shingrix #2 provided.  Follow up with specialists as directed.    F/U 3 months: Annual wellness visit.  "

## 2024-03-21 NOTE — PATIENT INSTRUCTIONS
Refilled medication.  Shingrix #2 provided.  Follow up with specialists as directed.    F/U 3 months: Annual wellness visit.

## 2024-04-02 ENCOUNTER — HOSPITAL ENCOUNTER (OUTPATIENT)
Dept: CARDIOLOGY | Facility: HOSPITAL | Age: 64
Discharge: HOME | End: 2024-04-02
Payer: COMMERCIAL

## 2024-04-02 ENCOUNTER — OFFICE VISIT (OUTPATIENT)
Dept: CARDIOLOGY | Facility: HOSPITAL | Age: 64
End: 2024-04-02
Payer: COMMERCIAL

## 2024-04-02 DIAGNOSIS — I25.10 CORONARY ARTERY DISEASE INVOLVING NATIVE CORONARY ARTERY OF NATIVE HEART, UNSPECIFIED WHETHER ANGINA PRESENT: ICD-10-CM

## 2024-04-02 DIAGNOSIS — I25.118 CORONARY ARTERY DISEASE OF NATIVE ARTERY OF NATIVE HEART WITH STABLE ANGINA PECTORIS (CMS-HCC): Primary | ICD-10-CM

## 2024-04-02 PROCEDURE — 93017 CV STRESS TEST TRACING ONLY: CPT

## 2024-04-02 PROCEDURE — 99214 OFFICE O/P EST MOD 30 MIN: CPT | Performed by: INTERNAL MEDICINE

## 2024-04-02 PROCEDURE — 93018 CV STRESS TEST I&R ONLY: CPT | Performed by: INTERNAL MEDICINE

## 2024-04-02 PROCEDURE — 3079F DIAST BP 80-89 MM HG: CPT | Performed by: INTERNAL MEDICINE

## 2024-04-02 PROCEDURE — 3074F SYST BP LT 130 MM HG: CPT | Performed by: INTERNAL MEDICINE

## 2024-04-02 PROCEDURE — 93016 CV STRESS TEST SUPVJ ONLY: CPT | Performed by: INTERNAL MEDICINE

## 2024-04-02 PROCEDURE — 1036F TOBACCO NON-USER: CPT | Performed by: INTERNAL MEDICINE

## 2024-04-03 VITALS
DIASTOLIC BLOOD PRESSURE: 80 MMHG | HEIGHT: 68 IN | WEIGHT: 155.1 LBS | SYSTOLIC BLOOD PRESSURE: 125 MMHG | BODY MASS INDEX: 23.51 KG/M2 | HEART RATE: 60 BPM

## 2024-04-03 NOTE — PROGRESS NOTES
Subjective:  Patient returns for a routine 4-month follow-up.  He is a very pleasant 64-year-old gentleman.  He has undergone prior complex CABG surgery.  He has also had native LCx stents as well as multiple SVG stents as well.  He has not had any hospitalizations since his last visit.  He denies any other new health concerns.  He is taking all of his medications compliantly and is tolerating them well.  He has been maintained on long-term dual antiplatelet therapy due to his complex coronary disease.    He feels he may be getting a bit more exertional chest discomfort.  He did undergo a stress test today which did provoke some symptoms as well as borderline ischemic EKG changes.  His exercise tolerance remains quite good.  He comes in today to discuss results.    Objective:  General: Alert, usual pleasant self.  HEENT: Unchanged.  Lungs: Clear without crackles or wheezing.  Cardiac: Distant heart tones without murmur rub or S3.  Abdomen: Nontender with normal bowel sounds.  Extremities: No edema.  Skin: No acute rash.  Neuro: Grossly intact.    Lipid panel: Cholesterol-129, HDL-40, LDL-68, TG-106.    Impression/plan:  Kennedy is struggling a bit at this time with some probable increased anginal symptomatology.  He is on appropriate medical management with excellent heart rate and blood pressure control.  He remains compliant with his dual antiplatelet therapy, and his lipid panel is essentially at goal on combination therapy.    He is a bit concerned about his symptoms and also expressed concerned about going to [place] in June with this symptomatology.  Given this he will sort out better how truly limiting he feels his symptoms are.  I will touch bases with him next week and discuss whether we should contemplate a repeat cardiac catheterization prior to his trip to [facility] in June.  He did not need any prescriptions renewed.    Patient instructions:    Continue current medications unchanged.    I will call you next  week to sort out if we should consider repeat catheterization given your symptoms and stress test results.

## 2024-04-09 ENCOUNTER — TELEPHONE (OUTPATIENT)
Dept: CARDIOLOGY | Facility: CLINIC | Age: 64
End: 2024-04-09
Payer: COMMERCIAL

## 2024-04-16 ENCOUNTER — TELEPHONE (OUTPATIENT)
Dept: CARDIOLOGY | Facility: CLINIC | Age: 64
End: 2024-04-16
Payer: COMMERCIAL

## 2024-04-16 ENCOUNTER — TELEPHONE (OUTPATIENT)
Dept: CARDIOLOGY | Facility: HOSPITAL | Age: 64
End: 2024-04-16
Payer: COMMERCIAL

## 2024-04-16 DIAGNOSIS — I10 ESSENTIAL HYPERTENSION: ICD-10-CM

## 2024-04-16 DIAGNOSIS — I20.0 UNSTABLE ANGINA (MULTI): Primary | ICD-10-CM

## 2024-04-16 DIAGNOSIS — I25.10 CAD (CORONARY ARTERY DISEASE): ICD-10-CM

## 2024-04-19 ENCOUNTER — LAB (OUTPATIENT)
Dept: LAB | Facility: LAB | Age: 64
End: 2024-04-19
Payer: COMMERCIAL

## 2024-04-19 DIAGNOSIS — I10 ESSENTIAL HYPERTENSION: ICD-10-CM

## 2024-04-19 DIAGNOSIS — I20.0 UNSTABLE ANGINA (MULTI): ICD-10-CM

## 2024-04-19 DIAGNOSIS — I25.10 CAD (CORONARY ARTERY DISEASE): ICD-10-CM

## 2024-04-19 LAB
ANION GAP SERPL CALC-SCNC: 14 MMOL/L (ref 10–20)
BUN SERPL-MCNC: 47 MG/DL (ref 6–23)
CALCIUM SERPL-MCNC: 10.1 MG/DL (ref 8.6–10.6)
CHLORIDE SERPL-SCNC: 105 MMOL/L (ref 98–107)
CO2 SERPL-SCNC: 25 MMOL/L (ref 21–32)
CREAT SERPL-MCNC: 1.61 MG/DL (ref 0.5–1.3)
EGFRCR SERPLBLD CKD-EPI 2021: 47 ML/MIN/1.73M*2
ERYTHROCYTE [DISTWIDTH] IN BLOOD BY AUTOMATED COUNT: 13.5 % (ref 11.5–14.5)
GLUCOSE SERPL-MCNC: 114 MG/DL (ref 74–99)
HCT VFR BLD AUTO: 44 % (ref 41–52)
HGB BLD-MCNC: 15 G/DL (ref 13.5–17.5)
INR PPP: 1 (ref 0.9–1.1)
MCH RBC QN AUTO: 28.6 PG (ref 26–34)
MCHC RBC AUTO-ENTMCNC: 34.1 G/DL (ref 32–36)
MCV RBC AUTO: 84 FL (ref 80–100)
NRBC BLD-RTO: 0 /100 WBCS (ref 0–0)
PLATELET # BLD AUTO: 181 X10*3/UL (ref 150–450)
POTASSIUM SERPL-SCNC: 4.4 MMOL/L (ref 3.5–5.3)
PROTHROMBIN TIME: 11 SECONDS (ref 9.8–12.8)
RBC # BLD AUTO: 5.25 X10*6/UL (ref 4.5–5.9)
SODIUM SERPL-SCNC: 140 MMOL/L (ref 136–145)
WBC # BLD AUTO: 7.2 X10*3/UL (ref 4.4–11.3)

## 2024-04-19 PROCEDURE — 36415 COLL VENOUS BLD VENIPUNCTURE: CPT

## 2024-04-19 PROCEDURE — 85610 PROTHROMBIN TIME: CPT

## 2024-04-19 PROCEDURE — 80048 BASIC METABOLIC PNL TOTAL CA: CPT

## 2024-04-19 PROCEDURE — 85027 COMPLETE CBC AUTOMATED: CPT

## 2024-04-30 ENCOUNTER — APPOINTMENT (OUTPATIENT)
Dept: CARDIOLOGY | Facility: HOSPITAL | Age: 64
End: 2024-04-30
Payer: COMMERCIAL

## 2024-04-30 ENCOUNTER — HOSPITAL ENCOUNTER (OUTPATIENT)
Facility: HOSPITAL | Age: 64
Setting detail: OUTPATIENT SURGERY
Discharge: HOME | End: 2024-04-30
Attending: INTERNAL MEDICINE | Admitting: INTERNAL MEDICINE
Payer: COMMERCIAL

## 2024-04-30 VITALS
HEIGHT: 68 IN | DIASTOLIC BLOOD PRESSURE: 88 MMHG | HEART RATE: 58 BPM | WEIGHT: 155.2 LBS | SYSTOLIC BLOOD PRESSURE: 130 MMHG | TEMPERATURE: 96.8 F | OXYGEN SATURATION: 97 % | BODY MASS INDEX: 23.52 KG/M2 | RESPIRATION RATE: 18 BRPM

## 2024-04-30 DIAGNOSIS — I20.0 ACCELERATING ANGINA (MULTI): Primary | ICD-10-CM

## 2024-04-30 DIAGNOSIS — I25.119 ATHEROSCLEROTIC HEART DISEASE OF NATIVE CORONARY ARTERY WITH UNSPECIFIED ANGINA PECTORIS (CMS-HCC): ICD-10-CM

## 2024-04-30 LAB
ATRIAL RATE: 54 BPM
P AXIS: 43 DEGREES
P OFFSET: 204 MS
P ONSET: 150 MS
PR INTERVAL: 146 MS
Q ONSET: 223 MS
QRS COUNT: 8 BEATS
QRS DURATION: 86 MS
QT INTERVAL: 434 MS
QTC CALCULATION(BAZETT): 411 MS
QTC FREDERICIA: 418 MS
R AXIS: -28 DEGREES
T AXIS: 36 DEGREES
T OFFSET: 440 MS
VENTRICULAR RATE: 54 BPM

## 2024-04-30 PROCEDURE — 99152 MOD SED SAME PHYS/QHP 5/>YRS: CPT | Performed by: INTERNAL MEDICINE

## 2024-04-30 PROCEDURE — 7100000010 HC PHASE TWO TIME - EACH INCREMENTAL 1 MINUTE: Performed by: INTERNAL MEDICINE

## 2024-04-30 PROCEDURE — 7100000002 HC RECOVERY ROOM TIME - EACH INCREMENTAL 1 MINUTE: Performed by: INTERNAL MEDICINE

## 2024-04-30 PROCEDURE — 2720000007 HC OR 272 NO HCPCS: Performed by: INTERNAL MEDICINE

## 2024-04-30 PROCEDURE — 93005 ELECTROCARDIOGRAM TRACING: CPT

## 2024-04-30 PROCEDURE — C1894 INTRO/SHEATH, NON-LASER: HCPCS | Performed by: INTERNAL MEDICINE

## 2024-04-30 PROCEDURE — 93010 ELECTROCARDIOGRAM REPORT: CPT | Performed by: INTERNAL MEDICINE

## 2024-04-30 PROCEDURE — 99153 MOD SED SAME PHYS/QHP EA: CPT | Performed by: INTERNAL MEDICINE

## 2024-04-30 PROCEDURE — 2500000005 HC RX 250 GENERAL PHARMACY W/O HCPCS: Performed by: INTERNAL MEDICINE

## 2024-04-30 PROCEDURE — 93458 L HRT ARTERY/VENTRICLE ANGIO: CPT | Performed by: INTERNAL MEDICINE

## 2024-04-30 PROCEDURE — 7100000009 HC PHASE TWO TIME - INITIAL BASE CHARGE: Performed by: INTERNAL MEDICINE

## 2024-04-30 PROCEDURE — 99222 1ST HOSP IP/OBS MODERATE 55: CPT | Performed by: NURSE PRACTITIONER

## 2024-04-30 PROCEDURE — 2550000001 HC RX 255 CONTRASTS: Performed by: INTERNAL MEDICINE

## 2024-04-30 PROCEDURE — 2500000004 HC RX 250 GENERAL PHARMACY W/ HCPCS (ALT 636 FOR OP/ED): Performed by: INTERNAL MEDICINE

## 2024-04-30 PROCEDURE — 7100000001 HC RECOVERY ROOM TIME - INITIAL BASE CHARGE: Performed by: INTERNAL MEDICINE

## 2024-04-30 PROCEDURE — 2500000004 HC RX 250 GENERAL PHARMACY W/ HCPCS (ALT 636 FOR OP/ED): Performed by: NURSE PRACTITIONER

## 2024-04-30 PROCEDURE — 93459 L HRT ART/GRFT ANGIO: CPT | Performed by: INTERNAL MEDICINE

## 2024-04-30 RX ORDER — MIDAZOLAM HYDROCHLORIDE 1 MG/ML
INJECTION, SOLUTION INTRAMUSCULAR; INTRAVENOUS AS NEEDED
Status: DISCONTINUED | OUTPATIENT
Start: 2024-04-30 | End: 2024-04-30 | Stop reason: HOSPADM

## 2024-04-30 RX ORDER — SODIUM CHLORIDE 9 MG/ML
150 INJECTION, SOLUTION INTRAVENOUS CONTINUOUS
Status: DISCONTINUED | OUTPATIENT
Start: 2024-04-30 | End: 2024-04-30 | Stop reason: HOSPADM

## 2024-04-30 RX ORDER — FENTANYL CITRATE 50 UG/ML
INJECTION, SOLUTION INTRAMUSCULAR; INTRAVENOUS AS NEEDED
Status: DISCONTINUED | OUTPATIENT
Start: 2024-04-30 | End: 2024-04-30 | Stop reason: HOSPADM

## 2024-04-30 RX ORDER — LIDOCAINE HYDROCHLORIDE 10 MG/ML
INJECTION, SOLUTION EPIDURAL; INFILTRATION; INTRACAUDAL; PERINEURAL AS NEEDED
Status: DISCONTINUED | OUTPATIENT
Start: 2024-04-30 | End: 2024-04-30 | Stop reason: HOSPADM

## 2024-04-30 RX ORDER — SODIUM CHLORIDE 9 MG/ML
150 INJECTION, SOLUTION INTRAVENOUS CONTINUOUS
Status: DISCONTINUED | OUTPATIENT
Start: 2024-04-30 | End: 2024-04-30

## 2024-04-30 RX ORDER — ACETAMINOPHEN 325 MG/1
650 TABLET ORAL EVERY 6 HOURS PRN
Status: DISCONTINUED | OUTPATIENT
Start: 2024-04-30 | End: 2024-05-01 | Stop reason: HOSPADM

## 2024-04-30 RX ADMIN — SODIUM CHLORIDE 150 ML/HR: 9 INJECTION, SOLUTION INTRAVENOUS at 15:15

## 2024-04-30 RX ADMIN — SODIUM CHLORIDE 150 ML/HR: 9 INJECTION, SOLUTION INTRAVENOUS at 10:50

## 2024-04-30 ASSESSMENT — COLUMBIA-SUICIDE SEVERITY RATING SCALE - C-SSRS
6. HAVE YOU EVER DONE ANYTHING, STARTED TO DO ANYTHING, OR PREPARED TO DO ANYTHING TO END YOUR LIFE?: NO
2. HAVE YOU ACTUALLY HAD ANY THOUGHTS OF KILLING YOURSELF?: NO
1. IN THE PAST MONTH, HAVE YOU WISHED YOU WERE DEAD OR WISHED YOU COULD GO TO SLEEP AND NOT WAKE UP?: NO

## 2024-04-30 ASSESSMENT — PAIN SCALES - GENERAL

## 2024-04-30 ASSESSMENT — PAIN - FUNCTIONAL ASSESSMENT

## 2024-04-30 ASSESSMENT — ENCOUNTER SYMPTOMS
EYES NEGATIVE: 1
HEMATOLOGIC/LYMPHATIC NEGATIVE: 1
PSYCHIATRIC NEGATIVE: 1
ENDOCRINE NEGATIVE: 1
MUSCULOSKELETAL NEGATIVE: 1
ALLERGIC/IMMUNOLOGIC NEGATIVE: 1
GASTROINTESTINAL NEGATIVE: 1
LIGHT-HEADEDNESS: 1
CONSTITUTIONAL NEGATIVE: 1
SHORTNESS OF BREATH: 1

## 2024-04-30 NOTE — Clinical Note
Accessed site: right femoral artery.   Ultrasound guidance was used. Flouro used to verify placement

## 2024-04-30 NOTE — DISCHARGE INSTRUCTIONS
Follow up with Dr. Powell in around 3 weeks. Appointment requested. Please call the office if you do not hear anything in 3 business days     Continue current medications.             CARDIAC CATHETERIZATION DISCHARGE INSTRUCTIONS     FOR SUDDEN AND SEVERE CHEST PAIN, SHORTNESS OF BREATH, EXCESSIVE BLEEDING, SIGNS OF STROKE, OR CHANGES IN MENTAL STATUS YOU SHOULD CALL 911 IMMEDIATELY.     If your provider has prescribed aspirin and/or clopidogrel (Plavix), or prasugrel (Effient), or ticagrelor (Brilinta), DO NOT STOP THESE MEDICATIONS for any reason without talking to your cardiologist first. If any of these were prescribed, you must take them every day without missing a single dose. If you are getting low on these medications, contact your provider immediately for a refill.     FOR NEXT 24 HOURS  - Upon discharge, you should return home and rest for the remainder of the day and evening. You do not have to stay on bed rest but should not be very active.  It is recommended a responsible adult be with you for the first 24 hours after the procedure.    - No driving for 24 hours after procedure. Please arrange for someone to drive you home from the hospital today.     - Do not drive, operate machinery, or use power tools for 24 hours after your procedure.     - Do not make any legal decisions for 24 hours after your procedure.     - Do not drink alcoholic beverages for 24 hours after your procedure.    WOUND CARE   *FOR FEMORAL (LEG) ACCESS*  ·      Avoid heavy lifting (over 10 pounds) for 7 days, squatting or excessive bending for 2 days, and strenuous exercise for 7 days.  ·      No submerged bathing, swimming, or hot tubs for the next 7 days, or until fully healed.  ·      Avoid sexual activity for 3-4 days until any groin discomfort has ceased.     *FOR RADIAL (WRIST) ACCESS*  ·      No lifting more than 5 pounds or excessive use of the wrist for 24 hours - for example, treat your wrist as if it is sprained.  ·       Do not engage in vigorous activities (tennis, golf, bowling, weights) for at least 48 hours after the procedure.  ·      Do not submerge the wrist for 7 days after the procedure.  ·      You should expect mild tingling in your hand and tenderness at the puncture site for up to 3 days.    - The transparent dressing should be removed from the site 24 hours after the procedure.  Wash the site gently with soap and water. Rinse well and pat dry. Keep the area clean and dry. You may apply a Band-Aid to the site. Avoid lotions, ointments, or powders until fully healed.     - You may shower the day after your procedure.      - It is normal to notice a small bruise around the puncture site and/or a small grape sized or smaller lump. Any large bruising or large lump warrants a call to the office.     - If bleeding should occur, lay down and apply pressure to the affected area for 10 minutes.  If the bleeding stops notify your physician.  If there is a large amount of bleeding or spurting of blood CALL 911 immediately.  DO NOT drive yourself to the hospital.    - You may experience some tenderness, bruising or minimal inflammation.  If you have any concerns, you may contact the Cath Lab or if any of these symptoms become excessive, contact your cardiologist or go to the emergency room.     OTHER INSTRUCTIONS  - You may take acetaminophen (Tylenol) as directed for discomfort.  If pain is not relieved with acetaminophen (Tylenol), contact your doctor.    - If you notice or experience any of the following, you should notify your doctor or seek medical attention  Chest pain or discomfort  Change in mental status or weakness in extremities.  Dizziness, light headedness, or feeling faint.  Change in the site where the procedure was performed, such as bleeding or an increased area of bruising or swelling.  Tingling, numbness, pain, or coolness in the leg/arm beyond the site where the procedure was performed.  Signs of infection  (i.e. shaking chills, temperature > 100 degrees Fahrenheit, warmth, redness) in the leg/arm area where the procedure was performed.  Changes in urination   Bloody or black stools  Vomiting blood  Severe nose bleeds  Any excessive bleeding    - If you DO NOT have an appointment with your cardiologist within 2-4 weeks following your procedure, please contact their office.

## 2024-04-30 NOTE — H&P
History Of Present Illness  Kennedy Jama is a 64 y.o. male presenting with CAD, angina. PMHx significant for CABG x5V 2013, PCI native Lcx stent, multiple SVG stents, HLD, HTN, abnormal renal fx. Exercise Stress Test 4/2/24 abnormal with with angina with borderline EKG changes at high cardiac workload.        Past Medical History  He has a past medical history of Coronary artery disease, Encounter for immunization (10/15/2013), Hyperlipidemia, Hypertension, and Rash and other nonspecific skin eruption (03/08/2013).    Surgical History  He has a past surgical history that includes Coronary artery bypass graft (02/19/2013); Coronary angioplasty with stent (02/19/2013); CT angio coronary art with heartflow if score >30% (10/19/2021); and Cardiac catheterization.     Social History  He reports that he has never smoked. He has never used smokeless tobacco. He reports that he does not currently use alcohol. He reports that he does not currently use drugs.    Family History  Family History   Problem Relation Name Age of Onset    Diabetes Mother      Prostate cancer Father      Diabetes Mother's Brother      Heart attack Mother's Brother      Diabetes Maternal Grandfather          Allergies  Patient has no known allergies.    Home Medications  No current facility-administered medications on file prior to encounter.     Current Outpatient Medications on File Prior to Encounter   Medication Sig Dispense Refill    ALPRAZolam (Xanax) 0.25 mg tablet Take 0.5 tablets (0.125 mg) by mouth 3 times a day as needed for anxiety. 42 tablet 0    clopidogrel (Plavix) 75 mg tablet Take 1 tablet (75 mg) by mouth once daily. For CAD      ezetimibe (Zetia) 10 mg tablet Take 1 tablet (10 mg) by mouth once daily at bedtime. For essential hypertension, hyperlipidemia      lisinopril 40 mg tablet Take 1 tablet (40 mg) by mouth once daily. For essential hypertension 90 tablet 3    LOW-DOSE ASPIRIN ORAL Take 81 mg by mouth once daily.       metoprolol succinate XL (Toprol-XL) 50 mg 24 hr tablet Take 1 tablet (50 mg) by mouth once daily. For CAD 90 tablet 0    rosuvastatin (Crestor) 20 mg tablet Take 1 tablet (20 mg) by mouth once daily at bedtime. hyperlipidemia      chlorthalidone (Hygroton) 25 mg tablet Take 1 tablet by mouth once daily (Patient not taking: Reported on 4/30/2024) 30 tablet 0    hydroCHLOROthiazide (HYDRODiuril) 25 mg tablet Take 1 tablet (25 mg) by mouth once daily. (Patient not taking: Reported on 4/30/2024) 30 tablet 11    nitroglycerin (Nitrostat) 0.4 mg SL tablet PLACE 1 TABLET UNDER THE TONGUE EVERY 5 MINUTES FOR UP TO 3 DOSES AS NEEDED FOR CHEST PAIN.CALL 911 IF PAIN PERSISTS for chest pain            Inpatient Medications:  Scheduled medications   Medication Dose Route Frequency    oxygen   inhalation Continuous - 02/gases     PRN medications   Medication     Continuous Medications   Medication Dose Last Rate    sodium chloride 0.9%  150 mL/hr 150 mL/hr (04/30/24 1050)         Review of Systems   Constitutional: Negative.    HENT: Negative.     Eyes: Negative.    Respiratory:  Positive for shortness of breath.    Cardiovascular:  Positive for chest pain.   Gastrointestinal: Negative.    Endocrine: Negative.    Genitourinary: Negative.    Musculoskeletal: Negative.    Skin: Negative.    Allergic/Immunologic: Negative.    Neurological:  Positive for light-headedness.   Hematological: Negative.    Psychiatric/Behavioral: Negative.            Physical Exam    General:  Patient is awake, alert, and oriented.  Patient is in no acute distress.  HEENT:  Pupils equal and reactive.  Normocephalic.  Moist mucosa.    Neck:  No JVD.   Cardiovascular:  Regular rate and rhythm.  Normal S1 and S2. No murmurs/rubs/gallops. Radial pulses 2+.   Pulmonary:  Clear to auscultation bilaterally.  Abdomen:  Soft. Non-tender.   Non-distended.  Positive bowel sounds.  Lower Extremities:  Pedal pulses 2+ No LE edema.  Neurologic:  Cranial nerves II-XII  "grossly intact.   No focal deficit.   Skin: Skin warm and dry, no lesions. Normal skin turgor.   Psychiatric: Normal affect.     Sedation Plan    ASA 3     Mallampati class: II.         Last Recorded Vitals  Blood pressure 151/70, pulse 57, temperature 36.6 °C (97.9 °F), temperature source Temporal, resp. rate 18, height 1.727 m (5' 8\"), weight 70.4 kg (155 lb 3.3 oz), SpO2 99%.         Vitals from the Past 24 Hours  Heart Rate:  [57]   Temp:  [36.6 °C (97.9 °F)]   Resp:  [18]   BP: (151)/(70)   Height:  [172.7 cm (5' 8\")]   Weight:  [70.4 kg (155 lb 3.3 oz)]   SpO2:  [99 %]          Relevant Results    Labs    CBC:   Recent Labs     04/19/24  0756 06/20/23  0947 11/09/22  0902 03/18/22  0939 10/26/21  0740 09/20/21  0720   WBC 7.2 8.1 6.7 7.9 6.4 5.6   HGB 15.0 15.8 15.6 14.9 15.3 14.1   HCT 44.0 46.1 45.4 42.8 42.9 40.4*    183 181 180 173 180   MCV 84 83 83 85 84 85     BMP/CMP:   Recent Labs     04/19/24  0756 06/20/23  0947 11/09/22  0902 03/18/22  0939 10/26/21  0740 09/20/21  0720 09/19/21  1819 09/17/21  0714 06/22/21  0851 03/08/21  0813 10/02/20  0854    139 139 140 142 139 140 138 137 141 141   K 4.4 4.1 4.5 4.1 4.1 3.7 3.9 4.4 4.4 4.4 4.1    100 104 105 106 106 106 105 104 106 105   BUN 47* 29* 27* 25* 23 21 24* 23 18 23 19   CREATININE 1.61* 1.26 1.14 1.12 1.20 1.01 1.38* 1.17 1.01 1.03 0.99   CO2 25 28 29 26 27 25 26 28 26 25 28   CALCIUM 10.1 10.4 9.7 10.1 9.8 9.2 9.4 9.9 9.9 9.7 9.8   PROT  --   --  7.4 7.5  --   --  7.0 6.9  --  6.9 6.9   BILITOT  --   --  1.1 0.9  --   --  0.7 0.8  --  0.6 0.7   ALKPHOS  --   --  96 114  --   --  89 95  --  114 92   ALT  --  32 32 34  --   --  24 25 31 17 18   AST  --  24 25 23  --   --  23 22 28 17 18   GLUCOSE 114* 109* 96 84 103* 97 103* 104* 85 96 107*      Lipid Panel:   Recent Labs     06/20/23  0947 11/09/22  0902 03/18/22  0939 09/17/21  0714 06/22/21  0851 03/08/21  0813 10/02/20  0854 07/14/20  0808 03/05/20  0749 01/02/20  0743 " "10/29/19  1512 07/11/19  1006 03/15/19  0835 10/30/18  0617 06/29/18  0812 01/23/18  0850   CHOL 129 112 112 107 130 210* 166 156 188 275* 140 162   < > 142   < > 174   HDL 39.7* 36.5* 41.4 39.5* 45.5 43.2 48.8 44.0 47.1 41.1 43.3 46.5   < > 33.3*   < > 40.3   CHHDL 3.2 3.1 2.7 2.7 2.9 4.9 3.4 3.5 4.0 6.7* 3.2 3.5   < > 4.3   < > 4.3   VLDL 21 20 20 16 20 23 21 20 26 49* 31 21   < > 46*   < > 41*   TRIG 106 101 99 79 98 117 105 98 130 246* 157* 107   < > 228*   < > 204*   NHDL  --   --   --   --   --   --   --   --   --  234  --   --   --  109  --  134    < > = values in this interval not displayed.     Cardiac       No lab exists for component: \"CK\", \"CKMBP\"   Hemoglobin A1C:   Recent Labs     06/20/23  0947 11/09/22  0902 03/18/22  0939 09/17/21  0714 06/22/21  0851 03/08/21  0813 10/02/20  0854 07/14/20  0808 03/05/20  0749 01/02/20  0743 10/29/19  1512 07/11/19  1006   HGBA1C 5.8* 5.2 5.5 5.2 5.4 5.6 5.5 5.3 5.4 5.3 5.1 5.6     TSH/ Free T4:   Recent Labs     11/09/22  0902 03/18/22  0939 09/17/21  0714 03/08/21  0813 10/02/20  0854 07/14/20  0808 03/05/20  0749 01/02/20  0743 10/29/19  1512 07/11/19  1006 03/15/19  0835 10/30/18  0617   TSH 2.52 3.82 2.62 3.10 3.15 2.36 2.69 2.93 2.25 2.20 2.40 4.20*     Iron:   Recent Labs     09/19/21  1819 10/30/18  0617   BNP 34 64     Coag:     ABO: No results found for: \"ABO\"    Past Cardiology Tests (Last 3 Years):  EKG:  Encounter Date: 12/19/23   ECG 12 lead (Clinic Performed)   Result Value    Ventricular Rate 56    Atrial Rate 56    DE Interval 148    QRS Duration 86    QT Interval 442    QTC Calculation(Bazett) 426    P Axis 37    R Axis -33    T Axis 29    QRS Count 10    Q Onset 219    P Onset 145    P Offset 197    T Offset 440    QTC Fredericia 432    Narrative    Sinus bradycardia  Left axis deviation  RSR' or QR pattern in V1 suggests right ventricular conduction delay  Inferior infarct (cited on or before 10-MAY-2022)  Cannot rule out Anterior infarct (cited " "on or before 10-MAY-2022)  Abnormal ECG  When compared with ECG of 27-JUN-2023 14:46,  No significant change was found  Confirmed by Hay Powell ([id number]) on 12/20/2023 8:58:29 AM     Echo:  No results found for this or any previous visit.    Ejection Fractions:  No results found for: \"EF\"  Cath:  No results found for this or any previous visit.    Stress Test:  Results for orders placed during the hospital encounter of 04/02/24    Stress Test    Narrative  [facility], Stress Lab, [address] [place] [address]  Tel [phone] and Fax [phone]    Exercise Stress Test    Patient Name:      CAITLIN ROWELL      Ordering Provider:     [id number] POWELL  Study Date:        4/2/2024             Reading Physician:     Lico Powell MD  MRN/PID:           [id number]             Supervising Physician: Lico Powell MD  Accession#:        [id number]         Fellow:  Date of Birth/Age: 1960 / 64 years Fellow:  Gender:            M                    Nurse:                 Wilbert Arriola RN  Admit Date:        4/2/2024             Technician:            Radha PITT  Admission Status:  Outpatient           Sonographer:           N/A  Height:            172.72 cm            Technologist:  Weight:            70.76 kg             Additional Staff:  BSA:               1.84 m2              Encounter#:            [id number]  BMI:               23.72 kg/m2          Patient Location:      [facility] Non  Invasive    Study Type:    STRESS TEST ONLY  Diagnosis/ICD: Atherosclerotic heart disease-I25.10  Indication:    CAD  CPT Code:      Stress Test Interpretation-88425; Stress Test Supervision-26793    Falls Risk: Low: Patient has a low risk for sustaining a fall; enviromental safety interventions in place.    Study Details: Correct procedure and correct patient verified verbally and with  ID Band checked.      Patient History: Coronary artery " disease, hypertension, hyperlipidemia and dyspnea. 64 y.o. presents for stress test for CAD.    PMH of CABG, stents, and fatigue.  Smoker:   Never.  Diabetes: No.      Medications: The patient's prescribed medication is Metoprolol Succinate XL, Hygroton, Lisinopril, Xanax, HCTZ, ASA, Plavix, Ezemtimibe, Rosuvastatin, Nitroglycerin. The patient did not take medications as prescribed.    Patient Performance: The patient exercised to stage IV on a Chase protocol for 10 minutes and 52 seconds, achieving 11.5 METS. The peak heart rate achieved was 134 bpm, which was 86 % of the age predicted target heart rate of 156 bpm. The resting blood pressure was 140/80 mmHg with a heart rate of 57 bpm. The standing blood pressure was 142/80 mmHg with a heart rate of 59 bpm. The patient developed shortness of breath, mid chest burning and leg fatigue during the stress exam. The symptoms resolved with rest. The blood pressure response was normal. The test was terminated due to: MPHR >85%, patient request, dyspnea and chest pain - angina. Patient has met the discharge criteria and is discharged to clinic appt.    Baseline ECG: Resting ECG showed sinus bradycardia with PVCs.    Stress ECG: Stress ECG showed sinus tachycardia, with PACs, PVCs, PVC bigeminy, PVC trigeminy. There was a 1.5 mm upsloping ST segment depression in leads V4, V5 and V6 during the peak stress period.    Stress Stage Data:  +-----------------+---+------+-------+----+------------------------------------+                   HR Sys BPDias BPMETSComments                              +-----------------+---+------+-------+----+------------------------------------+  Baseline Resting 57 140   80                                               +-----------------+---+------+-------+----+------------------------------------+  Baseline [id number] 142   80                                                +-----------------+---+------+-------+----+------------------------------------+  Baseline                             Spo2 99%, Denies chest pain and SOB   +-----------------+---+------+-------+----+------------------------------------+  Stage I          86 146   72         No symptoms                           +-----------------+---+------+-------+----+------------------------------------+  Stage II         93 156   74         Spo2 98%, C/o mid chest burning 4/10  +-----------------+---+------+-------+----+------------------------------------+  Stage III        108936   72         Spo2 99%, C/o mid chest burning 6/10                                       and slight SOB                        +-----------------+---+------+-------+----+------------------------------------+  Stage IV         133N/A   N/A    11.5Spo2 99%, C/o mid chest burning       +-----------------+---+------+-------+----+------------------------------------+      Recovery ECG: Recovery ECG showed normal sinus rhythm, with PACs, PVCs. The heart rate recovery was normal.    +------------+---+------+-------+----------------------------------------------+              HR Sys BPDias BPComments                                        +------------+---+------+-------+----------------------------------------------+  Recovery I  514228   68     Spo2 98%, C/o chest burning 7/10, SOB and leg                               fatigue                                         +------------+---+------+-------+----------------------------------------------+  Recovery II 93 180   72     Spo2 99%, Chest burning resolved, SOB and leg                               fatigue resolving                               +------------+---+------+-------+----------------------------------------------+  Recovery III77 174   62     Spo2 99%, No symptoms                            +------------+---+------+-------+----------------------------------------------+  Recovery IV 75 136   68     No symptoms                                     +------------+---+------+-------+----------------------------------------------+      Summary:  1. Provocation of angina with borderline EKG changes at a high cardiac workload. Clinical correlation suggested.  2. Abnormal Stress Test.  3. Adequate level of stress achieved.    [id number] Hay Powell MD  Electronically signed on 4/2/2024 at 5:04:21 PM              ** Final **    Cardiac Imaging:  Results for orders placed in visit on 10/19/21    CT ANGIO HEART CORONARY    Narrative  MRN: [id number]  Patient Name: CAITLIN ROWELL    STUDY:  CTA CORONARY ART WITH HEARTFLOW IF SCORE >30%.;  10/19/2021 8:45 am    INDICATION:  cad, angina, s/p pci/stent/cabg.    COMPARISON:  None.    ACCESSION NUMBER(S):  [id number]    ORDERING CLINICIAN:  AAKASH LAZO    TECHNIQUE:  Using multi-detector CT technology,  axial, sequential imaging with  prospective gating was performed of the chest following the  intravenous administration of contrast material.  A low-osmolar  contrast agent was used ([address] Isovue 370). In addition, CT-FFR  analysis was also performed.    The patient was premedicated with 0.8 mg sublingual nitroglycerin for  coronary dilation.    For optimization of anatomic evaluation, multiplanar reconstruction,  maximum intensity projections, and advanced 3-D off-line  postprocessing were performed on a dedicated stand-alone workstation  under the direct supervision of the interpreting physician.    CT Dose-Length Product (DLP):   379 mGy/cm  CT Dose Reduction Employed: Yes (Prospective triggering, iterative  reconstruction)    FINDINGS:  POTENTIAL STUDY LIMITATIONS: Limited evaluations secondary to metal  and stent artifacts.    CORONARY ARTERIES:    CORONARY ANATOMY:  There is normal origin of the coronary arteries.    LEFT MAIN CORONARY ARTERY:  The  left main is normal sized vessel that  bifurcates into the LAD  and circumflex.  The mid left main artery demonstrates severe atherosclerosis  resulting in greater than 50% luminal stenosis.    LEFT ANTERIOR DESCENDING ARTERY:  The LAD is a normal size vessel that  wraps around the apex.  It gives rise to  1 acute diagonal branches.  The ostial left main demonstrates focal calcified atherosclerotic  plaque resulting in up to 70% luminal stenosis. There is a patent  stents in the mid to distal left anterior descending artery, it is  unclear if this is antegrade or retrograde filling of the stent from  the patent LIMA-LAD graft.    LEFT CIRCUMFLEX ARTERY:  The LCX is a normal size vessel, which is  non-dominant.  It gives rise to  2 obtuse marginal branches.  The ostial and mid left circumflex demonstrates significant  atherosclerotic disease resulting in >70% luminal stenosis.      RIGHT CORONARY ARTERY:  The RCA is a normal size vessel, which is  dominant .  It gives rise to a  conus branch,  alix branch, and  1 acute  marginal branches.  In its distal segment it bifurcates into the PDA  and PV branch.  The proximal right coronary artery demonstrates chronic total  occlusion just prior to the stent.    GRAFTS  LIMA-LAD- appears patent within the limitation of heavy metal artifact  SVG-Diag 1- Appears occluded  2nd SVG- 2nd diag- Patent, no obvious signs of stenosis  3rdd SVG- 1st OM- Patent, no obvious signs of stenosis  4th SVG- PAD- Patent with no obvious signs of stenosis    CARDIAC CHAMBERS:  The cardiac chambers demonstrate normal atrioventricular and  ventriculoarterial concordance, and systemic and pulmonary venous  return.    LEFT ATRIUM:  Normal size (20.7 - cm2)    RIGHT ATRIUM:  Normal size (17.3 - cm2)    INTERATRIAL SEPTUM:  Intact.    LEFT VENTRICLE:  Normal size (3.6 - cm)    RIGHT VENTRICLE:  Normal size (4.3 - cm)    AORTIC VALVE:  The aortic valve is  trileaflet in morphology.  No  calcifications.    MITRAL VALVE:  No thickening/calcification.    THORACIC AORTA:  The visualized thoracic aorta is normal in course, caliber, and  contour. Thoracic aortic calcifications noted.  There is no acute aortic pathology, such as dissection, intramural  hematoma, or contained rupture.    PERICARDIUM:  There is no pericardial effusion of thickening.    CHEST:  The chest wall is normal.  No significant lymphadenopathy or mass is seen in limited images of  the mediastinum.  0.7cm calcified nodule in the left upper lobe  (image 53 of 240). Correlate with history of prior granulomatous  disease.  Limited imaging through the lungs reveals no gross abnormalities.  No pleural effusion or pneumothorax.    UPPER ABDOMEN:  Limited imaging through the upper abdomen reveals no abnormalities of  the visualized organs.    Impression  1.  Normal native coronary anatomy with evidence of severe three  vessel atherosclerotic disease.  2. Patent LIMA-LAD, Patent SVG-2nd Diag, Patent 2nd SVG to 1st Om,  and patent SVG to PDA. There appears to be an occluded graft to the  first diagonal.  3. 0.7cm calcified nodule in the left upper lobe (image 53 of 240).  Correlate with history of prior granulomatous disease    I personally reviewed the images/study and I agree with the findings  as stated. This study was interpreted at [place], [place].      === 11/02/18 ===    MRI CERVICAL SPINE WO CONTRAST    - Impression -  *Mild cervical spondylosis as described above.  [place] [facility] [place] 10/19/21 ===    CT HEART CORONARY ANGIOGRAM    - Impression -  1.  Normal native coronary anatomy with evidence of severe three  vessel atherosclerotic disease.  2. Patent LIMA-LAD, Patent SVG-2nd Diag, Patent 2nd SVG to 1st Om,  and patent SVG to PDA. There appears to be an occluded graft to the  first diagonal.  3. 0.7cm calcified nodule in the left upper lobe (image 53 of  240).  Correlate with history of prior granulomatous disease    I personally reviewed the images/study and I agree with the findings  as stated. This study was interpreted at [facility], [place], [place].     Assessment/Plan  Assessment/Plan   Active Problems:  There are no active Hospital Problems.        #CAD  #Angina  -[facility] with Dr. Powell 4/30/24    I spent 30 minutes in the professional and overall care of this patient.      Bessie Agarwal, AYLIN-CNP

## 2024-05-01 DIAGNOSIS — E78.00 HYPERCHOLESTEREMIA: Primary | ICD-10-CM

## 2024-05-01 RX ORDER — ROSUVASTATIN CALCIUM 20 MG/1
20 TABLET, COATED ORAL DAILY
Qty: 90 TABLET | Refills: 0 | Status: SHIPPED | OUTPATIENT
Start: 2024-05-01

## 2024-05-22 ENCOUNTER — OFFICE VISIT (OUTPATIENT)
Dept: CARDIOLOGY | Facility: HOSPITAL | Age: 64
End: 2024-05-22
Payer: COMMERCIAL

## 2024-05-22 VITALS
HEART RATE: 52 BPM | BODY MASS INDEX: 23.93 KG/M2 | DIASTOLIC BLOOD PRESSURE: 75 MMHG | HEIGHT: 68 IN | SYSTOLIC BLOOD PRESSURE: 135 MMHG | WEIGHT: 157.9 LBS

## 2024-05-22 DIAGNOSIS — Z98.890 STATUS POST LEFT HEART CATHETERIZATION: Primary | ICD-10-CM

## 2024-05-22 LAB
ATRIAL RATE: 52 BPM
P AXIS: 33 DEGREES
P OFFSET: 202 MS
P ONSET: 147 MS
PR INTERVAL: 150 MS
Q ONSET: 222 MS
QRS COUNT: 8 BEATS
QRS DURATION: 88 MS
QT INTERVAL: 442 MS
QTC CALCULATION(BAZETT): 411 MS
QTC FREDERICIA: 421 MS
R AXIS: -33 DEGREES
T AXIS: 31 DEGREES
T OFFSET: 443 MS
VENTRICULAR RATE: 52 BPM

## 2024-05-22 PROCEDURE — 3078F DIAST BP <80 MM HG: CPT | Performed by: INTERNAL MEDICINE

## 2024-05-22 PROCEDURE — 3075F SYST BP GE 130 - 139MM HG: CPT | Performed by: INTERNAL MEDICINE

## 2024-05-22 PROCEDURE — 93005 ELECTROCARDIOGRAM TRACING: CPT | Performed by: INTERNAL MEDICINE

## 2024-05-22 PROCEDURE — 99214 OFFICE O/P EST MOD 30 MIN: CPT | Performed by: INTERNAL MEDICINE

## 2024-05-22 PROCEDURE — 93010 ELECTROCARDIOGRAM REPORT: CPT | Performed by: INTERNAL MEDICINE

## 2024-05-22 PROCEDURE — 1036F TOBACCO NON-USER: CPT | Performed by: INTERNAL MEDICINE

## 2024-05-22 RX ORDER — NITROGLYCERIN 0.4 MG/1
0.4 TABLET SUBLINGUAL EVERY 5 MIN PRN
Qty: 90 TABLET | Refills: 2 | Status: SHIPPED | OUTPATIENT
Start: 2024-05-22 | End: 2024-06-16

## 2024-05-22 NOTE — PROGRESS NOTES
Subjective:  Patient returns for a post cath follow-up.  I was delighted to see that his catheterization generally looked quite stable.  He has multiple patent grafts.  He does have a small SVG-LADD1 which has high-grade stent restenosis but the distal vessel is quite small, so we have opted not to reintervene on this.  His other coronary disease looks quite stable, and his LV function remains normal.  His right femoral cath site has healed well.    He generally is doing well but does get some angina if he overdoes.  He remains somewhat anxious about this, but I reassured him I did not think that this was overly problematic.  He has not had any hospitalizations and denies any other new health concerns.  He is taking all of his medications compliantly and is tolerating them well.  He denies any bleeding problems despite good compliance with his aspirin and clopidogrel.  He is anxiously looking forward to going to [facility] for several months this summer.    Objective:  General: Alert, usual pleasant self.  HEENT: Unchanged.  Lungs: Clear without crackles.  Cardiac: Normal S1 and S2 with faint systolic murmur.  Abdomen: Nontender.  Extremities: No edema.  Skin: No rash.  Neuro: Grossly intact.    EKG: Sinus bradycardia.  Left axis deviation.  Borderline EKG.    Lipid panel: Cholesterol-129, HDL-40, LDL-68, TG-106.    Impression/plan:  Patient is generally doing reasonably well at this time.  I was delighted to see that his coronary disease appears to remain essentially stable.  I did encourage him to continue to stay active and to just reduce his exercise intensity if needed to control his chest discomfort.    I was delighted to see that his heart rate and blood pressure remain under good control.  He remains on appropriate chronic dual antiplatelet therapy given his multiple coronary stents.    His lipid panel looks quite good on combination therap,y so we will continue this unchanged.    Given his clinical stability, I  will plan on seeing him back for follow-up in 6 months.  He knows to take enough prescriptions with him on holiday to [facility], so that he does not run out.  He knows to call for any intercurrent concerns.  I will have him call me when he has repeat blood work obtained in several weeks to be sure that his renal function remains reasonably good after his recent contrast load.    Patient instructions:    Continue current medications unchanged.    Return to clinic in 6 months.

## 2024-06-07 ENCOUNTER — OFFICE VISIT (OUTPATIENT)
Dept: PRIMARY CARE | Facility: CLINIC | Age: 64
End: 2024-06-07
Payer: COMMERCIAL

## 2024-06-07 ENCOUNTER — LAB (OUTPATIENT)
Dept: LAB | Facility: LAB | Age: 64
End: 2024-06-07
Payer: COMMERCIAL

## 2024-06-07 VITALS
WEIGHT: 157 LBS | BODY MASS INDEX: 23.79 KG/M2 | HEART RATE: 53 BPM | DIASTOLIC BLOOD PRESSURE: 66 MMHG | SYSTOLIC BLOOD PRESSURE: 130 MMHG | HEIGHT: 68 IN | OXYGEN SATURATION: 98 % | RESPIRATION RATE: 16 BRPM

## 2024-06-07 DIAGNOSIS — E78.5 HYPERLIPIDEMIA, UNSPECIFIED HYPERLIPIDEMIA TYPE: ICD-10-CM

## 2024-06-07 DIAGNOSIS — Z12.11 COLON CANCER SCREENING: ICD-10-CM

## 2024-06-07 DIAGNOSIS — N52.9 ERECTILE DYSFUNCTION, UNSPECIFIED ERECTILE DYSFUNCTION TYPE: ICD-10-CM

## 2024-06-07 DIAGNOSIS — Z12.5 PROSTATE CANCER SCREENING: ICD-10-CM

## 2024-06-07 DIAGNOSIS — R91.8 LUNG NODULES: ICD-10-CM

## 2024-06-07 DIAGNOSIS — R73.03 PREDIABETES: ICD-10-CM

## 2024-06-07 DIAGNOSIS — I70.0 AORTIC ATHEROSCLEROSIS (CMS-HCC): ICD-10-CM

## 2024-06-07 DIAGNOSIS — R80.9 PROTEINURIA, UNSPECIFIED TYPE: ICD-10-CM

## 2024-06-07 DIAGNOSIS — I25.10 CORONARY ARTERY DISEASE INVOLVING NATIVE CORONARY ARTERY OF NATIVE HEART, UNSPECIFIED WHETHER ANGINA PRESENT: ICD-10-CM

## 2024-06-07 DIAGNOSIS — I20.0 UNSTABLE ANGINA (MULTI): ICD-10-CM

## 2024-06-07 DIAGNOSIS — I65.29 STENOSIS OF CAROTID ARTERY, UNSPECIFIED LATERALITY: ICD-10-CM

## 2024-06-07 DIAGNOSIS — I10 ESSENTIAL HYPERTENSION: ICD-10-CM

## 2024-06-07 DIAGNOSIS — Z00.00 ANNUAL PHYSICAL EXAM: Primary | ICD-10-CM

## 2024-06-07 DIAGNOSIS — Z86.010 HISTORY OF COLON POLYPS: ICD-10-CM

## 2024-06-07 PROBLEM — R79.89 ABNORMAL LFTS: Status: RESOLVED | Noted: 2023-03-08 | Resolved: 2024-06-07

## 2024-06-07 LAB
ALT SERPL W P-5'-P-CCNC: 40 U/L (ref 10–52)
ANION GAP SERPL CALC-SCNC: 13 MMOL/L (ref 10–20)
AST SERPL W P-5'-P-CCNC: 26 U/L (ref 9–39)
BUN SERPL-MCNC: 26 MG/DL (ref 6–23)
CALCIUM SERPL-MCNC: 10 MG/DL (ref 8.6–10.6)
CHLORIDE SERPL-SCNC: 102 MMOL/L (ref 98–107)
CHOLEST SERPL-MCNC: 115 MG/DL (ref 0–199)
CHOLESTEROL/HDL RATIO: 3.1
CO2 SERPL-SCNC: 28 MMOL/L (ref 21–32)
CREAT SERPL-MCNC: 1.27 MG/DL (ref 0.5–1.3)
EGFRCR SERPLBLD CKD-EPI 2021: 63 ML/MIN/1.73M*2
EST. AVERAGE GLUCOSE BLD GHB EST-MCNC: 120 MG/DL
GLUCOSE SERPL-MCNC: 97 MG/DL (ref 74–99)
HBA1C MFR BLD: 5.8 %
HDLC SERPL-MCNC: 37 MG/DL
LDLC SERPL CALC-MCNC: 56 MG/DL
NON HDL CHOLESTEROL: 78 MG/DL (ref 0–149)
POTASSIUM SERPL-SCNC: 4.4 MMOL/L (ref 3.5–5.3)
PSA SERPL-MCNC: 0.78 NG/ML
SODIUM SERPL-SCNC: 139 MMOL/L (ref 136–145)
TRIGL SERPL-MCNC: 109 MG/DL (ref 0–149)
VLDL: 22 MG/DL (ref 0–40)

## 2024-06-07 PROCEDURE — 36415 COLL VENOUS BLD VENIPUNCTURE: CPT

## 2024-06-07 PROCEDURE — 84460 ALANINE AMINO (ALT) (SGPT): CPT

## 2024-06-07 PROCEDURE — 83036 HEMOGLOBIN GLYCOSYLATED A1C: CPT

## 2024-06-07 PROCEDURE — 3075F SYST BP GE 130 - 139MM HG: CPT | Performed by: FAMILY MEDICINE

## 2024-06-07 PROCEDURE — 84153 ASSAY OF PSA TOTAL: CPT

## 2024-06-07 PROCEDURE — 99396 PREV VISIT EST AGE 40-64: CPT | Performed by: FAMILY MEDICINE

## 2024-06-07 PROCEDURE — 3078F DIAST BP <80 MM HG: CPT | Performed by: FAMILY MEDICINE

## 2024-06-07 PROCEDURE — 80048 BASIC METABOLIC PNL TOTAL CA: CPT

## 2024-06-07 PROCEDURE — 80061 LIPID PANEL: CPT

## 2024-06-07 PROCEDURE — 84450 TRANSFERASE (AST) (SGOT): CPT

## 2024-06-07 RX ORDER — METOPROLOL SUCCINATE 50 MG/1
50 TABLET, EXTENDED RELEASE ORAL DAILY
Qty: 90 TABLET | Refills: 1 | Status: SHIPPED | OUTPATIENT
Start: 2024-06-07

## 2024-06-07 ASSESSMENT — PATIENT HEALTH QUESTIONNAIRE - PHQ9
2. FEELING DOWN, DEPRESSED OR HOPELESS: NOT AT ALL
1. LITTLE INTEREST OR PLEASURE IN DOING THINGS: NOT AT ALL
SUM OF ALL RESPONSES TO PHQ9 QUESTIONS 1 AND 2: 0

## 2024-06-07 NOTE — PATIENT INSTRUCTIONS
Fasting labs.  Carotid ultrasound.  Refilled Metoprolol.  Colonoscopy referral.  Referred to urology as requested.  Follow up with cardiology and nephrology as directed.    F/U 6 months: Med refills.    Lab services: [address]  Hours: M-F 6:30a-6p, Sat 8a-12p  Phone: [phone], Option 1

## 2024-06-07 NOTE — PROGRESS NOTES
"Subjective   Patient ID: Kennedy Jama is a 64 y.o. male who presents for Annual Exam.    HPI   Patient's health is described as fair.  Regular dental visits: Yes.  Dental hygiene (brushing/flossing) regularly performed: Yes.  Corrective lenses: Yes (readers).  Vision problems: No.  Last eye exam within 1 year: No.  Hearing loss: No.  Requests audiology referral: No.  Immunizations up to date: Yes.  Healthy diet: Yes.  Regular exercise: Yes.  Trying to lose weight: No.  Requests nutrition/weight loss referral: No.  Sexually active: No.  Using contraception: N/A.  Requests STD screening: No.  Colon cancer screening up to date: No.  Lung cancer screening up to date: N/A.  Hepatitis C screening up to date: Yes.    H/O CAD.  Condition(s) stable.  Taking med(s) as directed.  Requests refills (Metoprolol).  Other meds for CAD provided by cardiology.    H/O ED.  Unable to take pills due to heart condition and NTG use.  Saw urology in the past.  Requests re-referral to discuss Tx options.    Review of Systems  No other complaints.     Objective   /66   Pulse 53   Resp 16   Ht 1.727 m (5' 8\")   Wt 71.2 kg (157 lb)   SpO2 98%   BMI 23.87 kg/m²     Physical Exam  Constitutional:       General: He is not in acute distress.     Appearance: He is normal weight.   HENT:      Head: Normocephalic.      Right Ear: Tympanic membrane normal.      Left Ear: Tympanic membrane normal.      Mouth/Throat:      Pharynx: Oropharynx is clear. No oropharyngeal exudate or posterior oropharyngeal erythema.   Eyes:      Extraocular Movements: Extraocular movements intact.      Conjunctiva/sclera: Conjunctivae normal.      Pupils: Pupils are equal, round, and reactive to light.   Neck:      Thyroid: No thyromegaly.      Vascular: No carotid bruit.   Cardiovascular:      Rate and Rhythm: Regular rhythm. Bradycardia present.      Heart sounds: Normal heart sounds. No murmur heard.     No friction rub. No gallop.   Pulmonary:      " Effort: Pulmonary effort is normal.      Breath sounds: Normal breath sounds. No wheezing, rhonchi or rales.   Abdominal:      General: Bowel sounds are normal. There is no distension.      Palpations: Abdomen is soft. There is no mass.      Tenderness: There is no abdominal tenderness. There is no guarding or rebound.   Genitourinary:     Comments: Declined prostate exam   Lymphadenopathy:      Cervical: No cervical adenopathy.   Skin:     Coloration: Skin is not jaundiced or pale.   Neurological:      General: No focal deficit present.      Mental Status: He is oriented to person, place, and time.   Psychiatric:         Mood and Affect: Mood normal.         Behavior: Behavior normal.     Assessment/Plan   Diagnoses and all orders for this visit:  Annual physical exam  Stenosis of carotid artery, unspecified laterality  -     Vascular US carotid artery duplex bilateral; Future  Proteinuria, unspecified type  -     Albumin , Urine Random; Future  -     Tx by nephrology  Erectile dysfunction, unspecified erectile dysfunction type  -     Referral to Urology; Future  Prediabetes  -     Basic Metabolic Panel; Future  -     Hemoglobin A1C; Future  History of colon polyps  -     Colonoscopy Screening; High Risk Patient; Future  Colon cancer screening  -     Colonoscopy Screening; High Risk Patient; Future  Prostate cancer screening  -     Prostate Specific Antigen, Screen; Future  Coronary artery disease involving native coronary artery of native heart, unspecified whether angina present  -     metoprolol succinate XL (Toprol-XL) 50 mg 24 hr tablet; Take 1 tablet (50 mg) by mouth once daily. For CAD  -     Tx by cardiology  Aortic atherosclerosis (CMS-HCC)        -     Tx by cardiology  Unstable angina (Multi)        -     Tx by cardiology  Essential hypertension        -     Tx by cardiology  Hyperlipidemia, unspecified hyperlipidemia type  -     Lipid Panel; Future  -     Aspartate Aminotransferase; Future  -      Alanine Aminotransferase; Future  -     Tx by cardiology  Lung nodules        -     Stable on last imaging (2021), nonsmoker, no follow up necessary    Fasting labs.  Refilled Metoprolol.  Referred to urology as requested.  Follow up with cardiology and nephrology as directed.    F/U 6 months: Med refills.

## 2024-08-22 DIAGNOSIS — I25.10 CORONARY ARTERY DISEASE, UNSPECIFIED VESSEL OR LESION TYPE, UNSPECIFIED WHETHER ANGINA PRESENT, UNSPECIFIED WHETHER NATIVE OR TRANSPLANTED HEART: ICD-10-CM

## 2024-08-23 RX ORDER — CLOPIDOGREL BISULFATE 75 MG/1
75 TABLET ORAL DAILY
Qty: 90 TABLET | Refills: 3 | Status: SHIPPED | OUTPATIENT
Start: 2024-08-23 | End: 2025-08-23

## 2024-08-23 RX ORDER — EZETIMIBE 10 MG/1
10 TABLET ORAL NIGHTLY
Qty: 90 TABLET | Refills: 3 | Status: SHIPPED | OUTPATIENT
Start: 2024-08-23 | End: 2025-08-23

## 2024-09-09 ENCOUNTER — HOSPITAL ENCOUNTER (OUTPATIENT)
Dept: VASCULAR MEDICINE | Facility: CLINIC | Age: 64
Discharge: HOME | End: 2024-09-09
Payer: COMMERCIAL

## 2024-09-09 DIAGNOSIS — I65.29 STENOSIS OF CAROTID ARTERY, UNSPECIFIED LATERALITY: ICD-10-CM

## 2024-09-09 DIAGNOSIS — R09.89 OTHER SPECIFIED SYMPTOMS AND SIGNS INVOLVING THE CIRCULATORY AND RESPIRATORY SYSTEMS: ICD-10-CM

## 2024-09-09 PROCEDURE — 93880 EXTRACRANIAL BILAT STUDY: CPT | Performed by: SURGERY

## 2024-09-09 PROCEDURE — 93880 EXTRACRANIAL BILAT STUDY: CPT

## 2024-09-20 DIAGNOSIS — E78.00 HYPERCHOLESTEREMIA: ICD-10-CM

## 2024-09-20 RX ORDER — ROSUVASTATIN CALCIUM 20 MG/1
20 TABLET, COATED ORAL DAILY
Qty: 90 TABLET | Refills: 0 | Status: SHIPPED | OUTPATIENT
Start: 2024-09-20

## 2024-11-12 ENCOUNTER — OFFICE VISIT (OUTPATIENT)
Dept: CARDIOLOGY | Facility: HOSPITAL | Age: 64
End: 2024-11-12
Payer: COMMERCIAL

## 2024-11-12 VITALS
BODY MASS INDEX: 24.1 KG/M2 | WEIGHT: 159 LBS | HEIGHT: 68 IN | SYSTOLIC BLOOD PRESSURE: 135 MMHG | DIASTOLIC BLOOD PRESSURE: 80 MMHG | HEART RATE: 58 BPM

## 2024-11-12 DIAGNOSIS — I10 ESSENTIAL HYPERTENSION: Primary | ICD-10-CM

## 2024-11-12 LAB
ATRIAL RATE: 58 BPM
P AXIS: 25 DEGREES
P OFFSET: 204 MS
P ONSET: 142 MS
PR INTERVAL: 162 MS
Q ONSET: 223 MS
QRS COUNT: 10 BEATS
QRS DURATION: 84 MS
QT INTERVAL: 416 MS
QTC CALCULATION(BAZETT): 408 MS
QTC FREDERICIA: 411 MS
R AXIS: -35 DEGREES
T AXIS: 41 DEGREES
T OFFSET: 431 MS
VENTRICULAR RATE: 58 BPM

## 2024-11-12 PROCEDURE — 3075F SYST BP GE 130 - 139MM HG: CPT | Performed by: INTERNAL MEDICINE

## 2024-11-12 PROCEDURE — 3008F BODY MASS INDEX DOCD: CPT | Performed by: INTERNAL MEDICINE

## 2024-11-12 PROCEDURE — 93010 ELECTROCARDIOGRAM REPORT: CPT | Performed by: INTERNAL MEDICINE

## 2024-11-12 PROCEDURE — 3079F DIAST BP 80-89 MM HG: CPT | Performed by: INTERNAL MEDICINE

## 2024-11-12 PROCEDURE — 99214 OFFICE O/P EST MOD 30 MIN: CPT | Performed by: INTERNAL MEDICINE

## 2024-11-12 PROCEDURE — 1036F TOBACCO NON-USER: CPT | Performed by: INTERNAL MEDICINE

## 2024-11-12 PROCEDURE — 93005 ELECTROCARDIOGRAM TRACING: CPT | Performed by: INTERNAL MEDICINE

## 2024-11-12 RX ORDER — AMLODIPINE BESYLATE 2.5 MG/1
2.5 TABLET ORAL DAILY
Qty: 90 TABLET | Refills: 3 | Status: SHIPPED | OUTPATIENT
Start: 2024-11-12 | End: 2025-11-12

## 2024-11-12 ASSESSMENT — ENCOUNTER SYMPTOMS
LOSS OF SENSATION IN FEET: 0
DEPRESSION: 0
OCCASIONAL FEELINGS OF UNSTEADINESS: 0

## 2024-11-12 NOTE — PROGRESS NOTES
Subjective:  Patient returns for a routine 6-month follow-up.  I was pleased to see that he got to [facility] this summer for 2 to 3 months and had a very enjoyable time.    He has not had any hospitalizations and denies any other new health concerns.  He continues to work full-time.  He is generally tolerating this well although does notice some easier fatigability.  He denies any prolonged or severe episodes of chest discomfort.  He is taking all of his medications compliantly and is tolerating them well.  He has been noticing some elevated blood pressure readings, and he does feel a bit funny with these at times.  He remains appropriately concerned about these.    Objective:  General: Alert, usual self.  HEENT: Unchanged.  Lungs: Clear without crackles or wheezing.  Cardiac: Distant heart tones without change.  Abdomen: Nontender.  Extremities: No edema.  Skin: No acute rash.  Neuro: Intact and unchanged.    EKG: Normal sinus rhythm.  RSR prime in V1.  Inferior infarct.  No acute changes.    Lipid panel: Cholesterol-115, HDL-37, LDL-56, TG-109.    Impression/plan:  Patient is doing reasonably well at this time.  He is not having any problematic anginal type symptomatology, so I did not think we needed to embark on any repeat ischemic evaluation.    My blood pressure reading today is reasonably good, but he does appear to get consistently elevated readings at home.  I will thus add amlodipine at 2.5 mg daily to his morning medical regimen and have him monitor his blood pressure several times a week.  I will see him back in 1 month just to reassess where his blood pressure control stands.    He remains on appropriate dual antiplatelet therapy for his multiple coronary stents.  He is not having any bleeding problems, so we will thus continue this unchanged.    His lipid panel looks excellent on combination therapy.    He knows to call for any intercurrent problems before his next follow-up in 1 month.  He did not need  any prescriptions renewed.    Patient instructions:    Begin amlodipine at 2.5 mg daily.    Monitor your blood pressures as directed.    Continue other medications unchanged.    Return to clinic in 1 month.

## 2024-11-15 DIAGNOSIS — I10 HYPERTENSION: ICD-10-CM

## 2024-11-15 RX ORDER — LISINOPRIL 40 MG/1
TABLET ORAL
Qty: 30 TABLET | Refills: 0 | Status: SHIPPED | OUTPATIENT
Start: 2024-11-15

## 2024-12-11 ENCOUNTER — OFFICE VISIT (OUTPATIENT)
Dept: CARDIOLOGY | Facility: HOSPITAL | Age: 64
End: 2024-12-11
Payer: COMMERCIAL

## 2024-12-11 VITALS — DIASTOLIC BLOOD PRESSURE: 70 MMHG | SYSTOLIC BLOOD PRESSURE: 115 MMHG | HEART RATE: 66 BPM

## 2024-12-11 DIAGNOSIS — I25.10 CORONARY ARTERY DISEASE INVOLVING NATIVE CORONARY ARTERY OF NATIVE HEART WITHOUT ANGINA PECTORIS: Primary | ICD-10-CM

## 2024-12-11 PROCEDURE — 99213 OFFICE O/P EST LOW 20 MIN: CPT | Performed by: INTERNAL MEDICINE

## 2024-12-11 PROCEDURE — 3078F DIAST BP <80 MM HG: CPT | Performed by: INTERNAL MEDICINE

## 2024-12-11 PROCEDURE — 1036F TOBACCO NON-USER: CPT | Performed by: INTERNAL MEDICINE

## 2024-12-11 PROCEDURE — 3074F SYST BP LT 130 MM HG: CPT | Performed by: INTERNAL MEDICINE

## 2024-12-11 NOTE — PROGRESS NOTES
Subjective:  Patient returns for a 1 month follow-up after escalation of his antihypertensive regimen.  He continues to do well with no problematic angina.  He did tolerate low-dose amlodipine at 2.5 mg daily.  His blood pressure readings at home have been better.  He denies any other new health concerns and has not had any side effects such as presyncope or syncope with his new medication.    Objective:  General: Alert, usual pleasant self.  HEENT: Unchanged.  Lungs: Clear without crackles or wheezing.  Cardiac: Distant heart tones without change.  Abdomen: Nontender.  Extremities: No edema.      Impression/plan:  Kennedy is generally doing quite nicely at this time.  He remains stable with his known extensive coronary artery disease.  His heart rate and blood pressure remain under good control and his blood pressure has come under excellent control with the addition of amlodipine to his medical regimen.  I will continue his current medications unchanged and will not embark on any other testing.  I will see him back for routine follow-up in 6 months.    Patient instructions:    Continue current medications unchanged.    Return to clinic in 6 months.

## 2024-12-14 DIAGNOSIS — E78.00 HYPERCHOLESTEREMIA: ICD-10-CM

## 2024-12-16 RX ORDER — ROSUVASTATIN CALCIUM 20 MG/1
20 TABLET, COATED ORAL DAILY
Qty: 90 TABLET | Refills: 3 | Status: SHIPPED | OUTPATIENT
Start: 2024-12-16

## 2025-01-04 DIAGNOSIS — I10 HYPERTENSION: ICD-10-CM

## 2025-01-06 RX ORDER — LISINOPRIL 40 MG/1
TABLET ORAL
Qty: 30 TABLET | Refills: 0 | Status: SHIPPED | OUTPATIENT
Start: 2025-01-06

## 2025-02-18 DIAGNOSIS — I25.10 CORONARY ARTERY DISEASE INVOLVING NATIVE CORONARY ARTERY OF NATIVE HEART, UNSPECIFIED WHETHER ANGINA PRESENT: Primary | ICD-10-CM

## 2025-02-18 RX ORDER — METOPROLOL SUCCINATE 50 MG/1
50 TABLET, EXTENDED RELEASE ORAL DAILY
Qty: 90 TABLET | Refills: 3 | Status: SHIPPED | OUTPATIENT
Start: 2025-02-18

## 2025-04-08 ENCOUNTER — APPOINTMENT (OUTPATIENT)
Dept: PRIMARY CARE | Facility: CLINIC | Age: 65
End: 2025-04-08
Payer: COMMERCIAL

## 2025-04-08 VITALS
OXYGEN SATURATION: 97 % | BODY MASS INDEX: 23.57 KG/M2 | TEMPERATURE: 98 F | WEIGHT: 155 LBS | HEART RATE: 62 BPM | DIASTOLIC BLOOD PRESSURE: 77 MMHG | SYSTOLIC BLOOD PRESSURE: 130 MMHG

## 2025-04-08 DIAGNOSIS — I10 HYPERTENSION: ICD-10-CM

## 2025-04-08 DIAGNOSIS — R10.9 ABDOMINAL PAIN, UNSPECIFIED ABDOMINAL LOCATION: ICD-10-CM

## 2025-04-08 DIAGNOSIS — K21.9 GASTROESOPHAGEAL REFLUX DISEASE, UNSPECIFIED WHETHER ESOPHAGITIS PRESENT: Primary | ICD-10-CM

## 2025-04-08 PROCEDURE — 99214 OFFICE O/P EST MOD 30 MIN: CPT | Performed by: FAMILY MEDICINE

## 2025-04-08 PROCEDURE — 1159F MED LIST DOCD IN RCRD: CPT | Performed by: FAMILY MEDICINE

## 2025-04-08 PROCEDURE — 1123F ACP DISCUSS/DSCN MKR DOCD: CPT | Performed by: FAMILY MEDICINE

## 2025-04-08 PROCEDURE — 3075F SYST BP GE 130 - 139MM HG: CPT | Performed by: FAMILY MEDICINE

## 2025-04-08 PROCEDURE — 1036F TOBACCO NON-USER: CPT | Performed by: FAMILY MEDICINE

## 2025-04-08 PROCEDURE — 3078F DIAST BP <80 MM HG: CPT | Performed by: FAMILY MEDICINE

## 2025-04-08 RX ORDER — LISINOPRIL 40 MG/1
TABLET ORAL
Qty: 30 TABLET | Refills: 0 | Status: SHIPPED | OUTPATIENT
Start: 2025-04-08

## 2025-04-08 RX ORDER — FAMOTIDINE 20 MG/1
20 TABLET, FILM COATED ORAL 2 TIMES DAILY
Qty: 60 TABLET | Refills: 0 | Status: SHIPPED | OUTPATIENT
Start: 2025-04-08 | End: 2025-05-08

## 2025-04-08 NOTE — PATIENT INSTRUCTIONS
Nonfasting labs.  Abdominal ultrasound.  Famotidine (Pepcid) x 30 days.  Call, send message through [email or web address], or return to office prn if these symptoms worsen or fail to improve as anticipated.     F/U 2 months: Annual wellness visit.    Lab services: [address]  Hours: M-F 7:15a-6:00p  Phone: [phone], Option 1

## 2025-04-08 NOTE — PROGRESS NOTES
Subjective   Patient ID: Kennedy Jama is a 65 y.o. male who presents for Abdominal Pain (Stomach pain started 2 weeks ago ).    HPI   Epigastric pain x 2 wks.  Was doing work in the garage (moving boxes, etc) 2 wks before onset, but no trauma or increased activity immediately prior to onset.  Described as burning.  Intermittent (daily).  Worse w/nothing.  Improved w/laying down.  No improvement w/Tums.  Max 8/10.  Ave 5-6/10.  Now 0/10.  No n/v/d/c.  No belching.  No stool color changes.  Has GERD but not currently taking any meds for GERD.  Had an ulcer in his 20s when he was drinking alcohol, but does not drink alcohol at this time.    Review of Systems  No other complaints.     Objective   /77   Pulse 62   Temp 36.7 °C (98 °F) (Temporal)   Wt 70.3 kg (155 lb)   SpO2 97%   BMI 23.57 kg/m²     Physical Exam  Constitutional:       General: He is not in acute distress.     Appearance: He is normal weight.   Cardiovascular:      Rate and Rhythm: Normal rate and regular rhythm.      Heart sounds: Normal heart sounds. No murmur heard.     No friction rub. No gallop.   Pulmonary:      Effort: Pulmonary effort is normal.      Breath sounds: Normal breath sounds. No wheezing, rhonchi or rales.   Abdominal:      General: Bowel sounds are normal. There is no distension.      Palpations: Abdomen is soft. There is no mass.      Tenderness: There is abdominal tenderness (mild, generalized). There is no guarding or rebound.   Skin:     Coloration: Skin is not jaundiced or pale.   Neurological:      Mental Status: He is oriented to person, place, and time.   Psychiatric:         Mood and Affect: Mood normal.         Behavior: Behavior normal.     Assessment/Plan   Diagnoses and all orders for this visit:  Gastroesophageal reflux disease, unspecified whether esophagitis present  -     famotidine (Pepcid) 20 mg tablet; Take 1 tablet (20 mg) by mouth 2 times a day.  Abdominal pain, unspecified abdominal location  -      CBC; Future  -     Comprehensive metabolic panel; Future  -     Lipase; Future  -     US abdomen complete; Future    Nonfasting labs.  Abdominal ultrasound.  Famotidine (Pepcid) x 30 days.  Call, send message through [email or web address], or return to office prn if these symptoms worsen or fail to improve as anticipated.     F/U 2 months: Annual wellness visit.

## 2025-04-09 LAB
ALBUMIN SERPL-MCNC: 4.6 G/DL (ref 3.6–5.1)
ALP SERPL-CCNC: 94 U/L (ref 35–144)
ALT SERPL-CCNC: 39 U/L (ref 9–46)
ANION GAP SERPL CALCULATED.4IONS-SCNC: 11 MMOL/L (CALC) (ref 7–17)
AST SERPL-CCNC: 28 U/L (ref 10–35)
BILIRUB SERPL-MCNC: 0.7 MG/DL (ref 0.2–1.2)
BUN SERPL-MCNC: 24 MG/DL (ref 7–25)
CALCIUM SERPL-MCNC: 9.8 MG/DL (ref 8.6–10.3)
CHLORIDE SERPL-SCNC: 105 MMOL/L (ref 98–110)
CO2 SERPL-SCNC: 23 MMOL/L (ref 20–32)
CREAT SERPL-MCNC: 1.12 MG/DL (ref 0.7–1.35)
EGFRCR SERPLBLD CKD-EPI 2021: 73 ML/MIN/1.73M2
ERYTHROCYTE [DISTWIDTH] IN BLOOD BY AUTOMATED COUNT: 13.6 % (ref 11–15)
GLUCOSE SERPL-MCNC: 118 MG/DL (ref 65–139)
HCT VFR BLD AUTO: 45.2 % (ref 38.5–50)
HGB BLD-MCNC: 15.3 G/DL (ref 13.2–17.1)
LIPASE SERPL-CCNC: 30 U/L (ref 7–60)
MCH RBC QN AUTO: 29 PG (ref 27–33)
MCHC RBC AUTO-ENTMCNC: 33.8 G/DL (ref 32–36)
MCV RBC AUTO: 85.6 FL (ref 80–100)
PLATELET # BLD AUTO: 193 THOUSAND/UL (ref 140–400)
PMV BLD REES-ECKER: 10.3 FL (ref 7.5–12.5)
POTASSIUM SERPL-SCNC: 4.2 MMOL/L (ref 3.5–5.3)
PROT SERPL-MCNC: 7.4 G/DL (ref 6.1–8.1)
RBC # BLD AUTO: 5.28 MILLION/UL (ref 4.2–5.8)
SODIUM SERPL-SCNC: 139 MMOL/L (ref 135–146)
WBC # BLD AUTO: 8.3 THOUSAND/UL (ref 3.8–10.8)

## 2025-04-10 ENCOUNTER — HOSPITAL ENCOUNTER (OUTPATIENT)
Dept: RADIOLOGY | Facility: CLINIC | Age: 65
Discharge: HOME | End: 2025-04-10
Payer: MEDICARE

## 2025-04-10 DIAGNOSIS — R10.9 ABDOMINAL PAIN, UNSPECIFIED ABDOMINAL LOCATION: ICD-10-CM

## 2025-04-10 PROCEDURE — 76700 US EXAM ABDOM COMPLETE: CPT

## 2025-04-13 DIAGNOSIS — D13.5 ADENOMYOMATOSIS OF GALLBLADDER: Primary | ICD-10-CM

## 2025-04-23 ENCOUNTER — OFFICE VISIT (OUTPATIENT)
Dept: CARDIOLOGY | Facility: HOSPITAL | Age: 65
End: 2025-04-23
Payer: MEDICARE

## 2025-04-23 VITALS
WEIGHT: 155 LBS | DIASTOLIC BLOOD PRESSURE: 75 MMHG | HEIGHT: 68 IN | SYSTOLIC BLOOD PRESSURE: 120 MMHG | BODY MASS INDEX: 23.49 KG/M2 | HEART RATE: 57 BPM

## 2025-04-23 DIAGNOSIS — I10 HYPERTENSION: ICD-10-CM

## 2025-04-23 DIAGNOSIS — I25.10 CORONARY ARTERY DISEASE, UNSPECIFIED VESSEL OR LESION TYPE, UNSPECIFIED WHETHER ANGINA PRESENT, UNSPECIFIED WHETHER NATIVE OR TRANSPLANTED HEART: ICD-10-CM

## 2025-04-23 DIAGNOSIS — E78.00 HYPERCHOLESTEREMIA: ICD-10-CM

## 2025-04-23 DIAGNOSIS — I10 ESSENTIAL HYPERTENSION: ICD-10-CM

## 2025-04-23 DIAGNOSIS — I25.10 CORONARY ARTERY DISEASE INVOLVING NATIVE CORONARY ARTERY OF NATIVE HEART, UNSPECIFIED WHETHER ANGINA PRESENT: Primary | ICD-10-CM

## 2025-04-23 LAB
ATRIAL RATE: 57 BPM
P AXIS: 37 DEGREES
P OFFSET: 200 MS
P ONSET: 146 MS
PR INTERVAL: 156 MS
Q ONSET: 224 MS
QRS COUNT: 10 BEATS
QRS DURATION: 98 MS
QT INTERVAL: 416 MS
QTC CALCULATION(BAZETT): 404 MS
QTC FREDERICIA: 409 MS
R AXIS: -40 DEGREES
T AXIS: 49 DEGREES
T OFFSET: 432 MS
VENTRICULAR RATE: 57 BPM

## 2025-04-23 PROCEDURE — 1036F TOBACCO NON-USER: CPT | Performed by: INTERNAL MEDICINE

## 2025-04-23 PROCEDURE — 1123F ACP DISCUSS/DSCN MKR DOCD: CPT | Performed by: INTERNAL MEDICINE

## 2025-04-23 PROCEDURE — 93005 ELECTROCARDIOGRAM TRACING: CPT | Performed by: INTERNAL MEDICINE

## 2025-04-23 PROCEDURE — 3078F DIAST BP <80 MM HG: CPT | Performed by: INTERNAL MEDICINE

## 2025-04-23 PROCEDURE — 1160F RVW MEDS BY RX/DR IN RCRD: CPT | Performed by: INTERNAL MEDICINE

## 2025-04-23 PROCEDURE — 3074F SYST BP LT 130 MM HG: CPT | Performed by: INTERNAL MEDICINE

## 2025-04-23 PROCEDURE — 93010 ELECTROCARDIOGRAM REPORT: CPT | Performed by: INTERNAL MEDICINE

## 2025-04-23 PROCEDURE — 99214 OFFICE O/P EST MOD 30 MIN: CPT | Performed by: INTERNAL MEDICINE

## 2025-04-23 PROCEDURE — 1159F MED LIST DOCD IN RCRD: CPT | Performed by: INTERNAL MEDICINE

## 2025-04-23 PROCEDURE — 3008F BODY MASS INDEX DOCD: CPT | Performed by: INTERNAL MEDICINE

## 2025-04-23 PROCEDURE — 99212 OFFICE O/P EST SF 10 MIN: CPT | Performed by: INTERNAL MEDICINE

## 2025-04-23 RX ORDER — METOPROLOL SUCCINATE 50 MG/1
50 TABLET, EXTENDED RELEASE ORAL DAILY
Qty: 90 TABLET | Refills: 3 | Status: SHIPPED | OUTPATIENT
Start: 2025-04-23

## 2025-04-23 RX ORDER — LISINOPRIL 40 MG/1
40 TABLET ORAL DAILY
Qty: 90 TABLET | Refills: 3 | Status: SHIPPED | OUTPATIENT
Start: 2025-04-23 | End: 2026-04-23

## 2025-04-23 RX ORDER — AMLODIPINE BESYLATE 2.5 MG/1
2.5 TABLET ORAL DAILY
Qty: 90 TABLET | Refills: 3 | Status: SHIPPED | OUTPATIENT
Start: 2025-04-23 | End: 2026-04-23

## 2025-04-23 RX ORDER — EZETIMIBE 10 MG/1
10 TABLET ORAL NIGHTLY
Qty: 90 TABLET | Refills: 3 | Status: SHIPPED | OUTPATIENT
Start: 2025-04-23 | End: 2026-04-23

## 2025-04-23 RX ORDER — CLOPIDOGREL BISULFATE 75 MG/1
75 TABLET ORAL DAILY
Qty: 90 TABLET | Refills: 3 | Status: SHIPPED | OUTPATIENT
Start: 2025-04-23 | End: 2026-04-23

## 2025-04-23 RX ORDER — ROSUVASTATIN CALCIUM 20 MG/1
20 TABLET, COATED ORAL DAILY
Qty: 90 TABLET | Refills: 3 | Status: SHIPPED | OUTPATIENT
Start: 2025-04-23

## 2025-04-23 NOTE — PROGRESS NOTES
Subjective:  Patient returns for an early follow-up.  He is a pleasant 65-year-old gentleman with extensive coronary disease status post CABG as well as PCIs.  He also has hypertension and hyperlipidemia.  I was pleased to see that a repeat catheterization about a year ago generally looked quite stable with stable native disease and most of his grafts/stents were nicely patent.  He did have a diagonal graft stent with restenosis but this was a small vessel.    He presents with some atypical left upper abdominal and left lower chest discomfort.  This is somewhat poorly characterized but does have him somewhat concerned.  He did try famotidine for short-term but did not take it very long.  When pressed, he did not think that his symptoms are similar to what he had had prior to his bypass surgery or stenting.    He remains compliant with his medications and is tolerating them well.  He generally appears to tolerate a reasonable physical activity level without any problems.    Objective:  General: Alert, usual pleasant self.  HEENT: Unchanged.  Lungs: Clear without crackles.  Cardiac: Distant heart tones without change.  Abdomen: Nontender.  Extremities: No edema.  Skin: No acute rash.    EKG: Sinus bradycardia.  Slow R wave progression.  Possible inferior infarct.  No acute ST or T wave changes.    Lipid panel: Cholesterol-115, HDL-37, LDL-56, TG-109.    Impression/plan:  Patient generally appears to be reasonably stable at this time.  Given his discomfort, I will plan on setting him up for a stress test just to be sure that his exercise tolerance remains stable.  I was pleased to see he will be seeing some other doctors to see if there is an alternative etiology for his abdominal/chest discomfort.  I did encourage him to go back on his famotidine.    His heart rate and blood pressure remain under good control.  I elected to continue his antihypertensive medicines unchanged.    His lipid panel looks good on combination  therapy so we will continue this unchanged.    I will see him back as originally planned in 2 months but will review his stress test results with him when they are available.  He will keep me updated regarding his evaluations by his other physicians.    Patient instructions:    Continue current medications unchanged.    Report for your stress test when scheduled.    Return to clinic in 2 months.

## 2025-04-29 ENCOUNTER — OFFICE VISIT (OUTPATIENT)
Dept: SURGERY | Facility: HOSPITAL | Age: 65
End: 2025-04-29
Payer: MEDICARE

## 2025-04-29 VITALS — DIASTOLIC BLOOD PRESSURE: 70 MMHG | TEMPERATURE: 98.2 F | SYSTOLIC BLOOD PRESSURE: 117 MMHG | HEART RATE: 62 BPM

## 2025-04-29 DIAGNOSIS — D13.5 ADENOMYOMATOSIS OF GALLBLADDER: ICD-10-CM

## 2025-04-29 DIAGNOSIS — K27.9 PUD (PEPTIC ULCER DISEASE): Primary | ICD-10-CM

## 2025-04-29 PROCEDURE — 1159F MED LIST DOCD IN RCRD: CPT | Performed by: SURGERY

## 2025-04-29 PROCEDURE — 1123F ACP DISCUSS/DSCN MKR DOCD: CPT | Performed by: SURGERY

## 2025-04-29 PROCEDURE — 3074F SYST BP LT 130 MM HG: CPT | Performed by: SURGERY

## 2025-04-29 PROCEDURE — 3078F DIAST BP <80 MM HG: CPT | Performed by: SURGERY

## 2025-04-29 PROCEDURE — 1036F TOBACCO NON-USER: CPT | Performed by: SURGERY

## 2025-04-29 PROCEDURE — 99204 OFFICE O/P NEW MOD 45 MIN: CPT | Performed by: SURGERY

## 2025-04-29 PROCEDURE — 1125F AMNT PAIN NOTED PAIN PRSNT: CPT | Performed by: SURGERY

## 2025-04-29 PROCEDURE — 99214 OFFICE O/P EST MOD 30 MIN: CPT | Performed by: SURGERY

## 2025-04-29 RX ORDER — PANTOPRAZOLE SODIUM 20 MG/1
20 TABLET, DELAYED RELEASE ORAL
Qty: 30 TABLET | Refills: 11 | Status: SHIPPED | OUTPATIENT
Start: 2025-04-29 | End: 2026-04-29

## 2025-04-29 ASSESSMENT — PAIN SCALES - GENERAL: PAINLEVEL_OUTOF10: 4

## 2025-04-29 NOTE — LETTER
April 29, 2025     Genaro Gregg MD  [address]  [facility]  [facility] [address]    Patient: Kennedy Jama   YOB: 1960   Date of Visit: 4/29/2025       Dear Dr. Genaro Gregg MD:    Thank you for referring Kennedy Jama to me for evaluation. Below are my notes for this consultation.  If you have questions, please do not hesitate to call me. I look forward to following your patient along with you.       Sincerely,     James Fisher MD      CC: No Recipients  ______________________________________________________________________________________    History Of Present Illness  Kennedy Jama is a 65 y.o. male presenting with several month history of worsening epigastric and left upper quadrant abdominal pain.  Seems to be worse when he eats.  No nausea vomiting.  No change in the color of urine or stool.  No recent traumas.  His primary doctor did send him for an ultrasound which showed probable adenomyomatosis of the gallbladder fundus.  No gallstones were seen.  He also has a history of gastric ulcer that resulted in hospitalization when he was in his 20s.  He also has a fairly significant coronary history.  Had quadruple bypass when he was age 38.  He has had numerous subsequent coronary stents placed over the years.  He sees Dr. Powell.     Past Medical History  Medical History[1]    Surgical History  Surgical History[2]     Social History  He reports that he has never smoked. He has never used smokeless tobacco. He reports that he does not currently use alcohol. He reports that he does not currently use drugs.    Family History  Family History[3]     Allergies  Patient has no known allergies.    Review of Systems  Constitutional: no weight loss, no fevers, no malaise  HEENT: negative  Neck: negative  Pulmonary: no SOB, no cough  CV: CAD  GI: See HPI  : no hematuria, retention.  MS: no aches/pains  Neurologic: negative  Skin: no rashes, lesions  HEME:  no bleeding tendency, no bruising  Psych: no mood issues    Physical Exam  General: well appearing, no acute distress, well nourished  HEENT: normal  Neck: supple  Pulmonary: lungs clear to auscultation bilaterally  CV: RR, S1S2, no murmurs.  Pulses palpable and equal.  Good capillary refill  Abdomen: soft, non tender, no masses.  Negative Fishman sign  : grossly normal external genitalia  MS: grossly normal  Neurologic: alert and oriented, strength/sensation intact  Skin: non jaundiced, no lesions  Psych: mood appropriate    Last Recorded Vitals  Blood pressure 117/70, pulse 62, temperature 36.8 °C (98.2 °F), temperature source Temporal.    Relevant Results      Ultrasound reviewed       Left upper quadrant and epigastric abdominal pain    Patient with epigastric left upper quadrant pain.  Symptoms are not suggestive of biliary colic.  Ultrasound moreover does not show any sludge or gallstones.  The adenomyomatosis not likely to be contributing to his clinical presentation.  I think the more likely explanation might be peptic ulcer disease especially given his past history.  Order for pantoprazole was placed in the system.  I have put a referral in for an upper endoscopy with GI.  He is also due for a colonoscopy.  Perhaps those can be done at the same time.  Once the studies are completed we can reassess any role for surgery on the gallbladder.    I spent 40 minutes in the professional and overall care of this patient.      James Fisher MD         [1]  Past Medical History:  Diagnosis Date   • Coronary artery disease    • Encounter for immunization 10/15/2013    Need for prophylactic vaccination and inoculation against influenza   • Hyperlipidemia    • Hypertension    • Rash and other nonspecific skin eruption 03/08/2013    Rash   [2]  Past Surgical History:  Procedure Laterality Date   • CARDIAC CATHETERIZATION     • CARDIAC CATHETERIZATION N/A 4/30/2024    Procedure: Left Heart Cath with Coronary  Angiography and LV;  Surgeon: Hay Davies MD;  Location: [facility] Cardiac Cath Lab;  Service: Cardiovascular;  Laterality: N/A;  LEFT HEART CATH TUES APRIL 23RD, 2024 AT 12:00 NOON PT WILL ARRIVE AT 10:30 AM @ [facility]   • CORONARY ANGIOPLASTY WITH STENT PLACEMENT  02/19/2013   • CORONARY ARTERY BYPASS GRAFT  02/19/2013    x5v   • CT ANGIO CORONARY ART WITH HEARTFLOW IF SCORE >30%  10/19/2021    CT HEART CORONARY ANGIOGRAM 10/19/2021 [facility] ANCILLARY LEGACY   [3]  Family History  Problem Relation Name Age of Onset   • Diabetes Mother     • Prostate cancer Father     • Diabetes Mother's Brother     • Heart attack Mother's Brother     • Diabetes Maternal Grandfather          [1]  Past Medical History:  Diagnosis Date   • Coronary artery disease    • Encounter for immunization 10/15/2013    Need for prophylactic vaccination and inoculation against influenza   • Hyperlipidemia    • Hypertension    • Rash and other nonspecific skin eruption 03/08/2013    Rash   [2]  Past Surgical History:  Procedure Laterality Date   • CARDIAC CATHETERIZATION     • CARDIAC CATHETERIZATION N/A 4/30/2024    Procedure: Left Heart Cath with Coronary Angiography and LV;  Surgeon: Hay Davies MD;  Location: [facility] Cardiac Cath Lab;  Service: Cardiovascular;  Laterality: N/A;  LEFT HEART CATH TUES APRIL 23RD, 2024 AT 12:00 NOON PT WILL ARRIVE AT 10:30 AM @ [facility]   • CORONARY ANGIOPLASTY WITH STENT PLACEMENT  02/19/2013   • CORONARY ARTERY BYPASS GRAFT  02/19/2013    x5v   • CT ANGIO CORONARY ART WITH HEARTFLOW IF SCORE >30%  10/19/2021    CT HEART CORONARY ANGIOGRAM 10/19/2021 [facility] ANCILLARY LEGACY   [3]  Family History  Problem Relation Name Age of Onset   • Diabetes Mother     • Prostate cancer Father     • Diabetes Mother's Brother     • Heart attack Mother's Brother     • Diabetes Maternal Grandfather

## 2025-04-29 NOTE — PROGRESS NOTES
History Of Present Illness  Kennedy Jama is a 65 y.o. male presenting with several month history of worsening epigastric and left upper quadrant abdominal pain.  Seems to be worse when he eats.  No nausea vomiting.  No change in the color of urine or stool.  No recent traumas.  His primary doctor did send him for an ultrasound which showed probable adenomyomatosis of the gallbladder fundus.  No gallstones were seen.  He also has a history of gastric ulcer that resulted in hospitalization when he was in his 20s.  He also has a fairly significant coronary history.  Had quadruple bypass when he was age 38.  He has had numerous subsequent coronary stents placed over the years.  He sees Dr. Powell.     Past Medical History  Medical History[1]    Surgical History  Surgical History[2]     Social History  He reports that he has never smoked. He has never used smokeless tobacco. He reports that he does not currently use alcohol. He reports that he does not currently use drugs.    Family History  Family History[3]     Allergies  Patient has no known allergies.    Review of Systems  Constitutional: no weight loss, no fevers, no malaise  HEENT: negative  Neck: negative  Pulmonary: no SOB, no cough  CV: CAD  GI: See HPI  : no hematuria, retention.  MS: no aches/pains  Neurologic: negative  Skin: no rashes, lesions  HEME: no bleeding tendency, no bruising  Psych: no mood issues    Physical Exam  General: well appearing, no acute distress, well nourished  HEENT: normal  Neck: supple  Pulmonary: lungs clear to auscultation bilaterally  CV: RR, S1S2, no murmurs.  Pulses palpable and equal.  Good capillary refill  Abdomen: soft, non tender, no masses.  Negative Fishman sign  : grossly normal external genitalia  MS: grossly normal  Neurologic: alert and oriented, strength/sensation intact  Skin: non jaundiced, no lesions  Psych: mood appropriate    Last Recorded Vitals  Blood pressure 117/70, pulse 62, temperature 36.8 °C  (98.2 °F), temperature source Temporal.    Relevant Results      Ultrasound reviewed       Left upper quadrant and epigastric abdominal pain    Patient with epigastric left upper quadrant pain.  Symptoms are not suggestive of biliary colic.  Ultrasound moreover does not show any sludge or gallstones.  The adenomyomatosis not likely to be contributing to his clinical presentation.  I think the more likely explanation might be peptic ulcer disease especially given his past history.  Order for pantoprazole was placed in the system.  I have put a referral in for an upper endoscopy with GI.  He is also due for a colonoscopy.  Perhaps those can be done at the same time.  Once the studies are completed we can reassess any role for surgery on the gallbladder.    I spent 40 minutes in the professional and overall care of this patient.      James Fisher MD         [1]   Past Medical History:  Diagnosis Date    Coronary artery disease     Encounter for immunization 10/15/2013    Need for prophylactic vaccination and inoculation against influenza    Hyperlipidemia     Hypertension     Rash and other nonspecific skin eruption 03/08/2013    Rash   [2]   Past Surgical History:  Procedure Laterality Date    CARDIAC CATHETERIZATION      CARDIAC CATHETERIZATION N/A 4/30/2024    Procedure: Left Heart Cath with Coronary Angiography and LV;  Surgeon: Hay Davies MD;  Location: [facility] Cardiac Cath Lab;  Service: Cardiovascular;  Laterality: N/A;  LEFT HEART CATH TUES APRIL 23RD, 2024 AT 12:00 NOON PT WILL ARRIVE AT 10:30 AM @ [facility] - [facility]    CORONARY ANGIOPLASTY WITH STENT PLACEMENT  02/19/2013    CORONARY ARTERY BYPASS GRAFT  02/19/2013    x5v    CT ANGIO CORONARY ART WITH HEARTFLOW IF SCORE >30%  10/19/2021    CT HEART CORONARY ANGIOGRAM 10[place]19/2021 [facility] [place] [facility]   [3]   Family History  Problem Relation Name Age of Onset    Diabetes Mother      Prostate cancer Father      Diabetes Mother's Brother      Heart  attack Mother's Brother      Diabetes Maternal Grandfather

## 2025-05-08 ENCOUNTER — HOSPITAL ENCOUNTER (OUTPATIENT)
Dept: CARDIOLOGY | Facility: HOSPITAL | Age: 65
Discharge: HOME | End: 2025-05-08
Payer: MEDICARE

## 2025-05-08 DIAGNOSIS — I25.10 CORONARY ARTERY DISEASE INVOLVING NATIVE CORONARY ARTERY OF NATIVE HEART, UNSPECIFIED WHETHER ANGINA PRESENT: ICD-10-CM

## 2025-05-08 PROCEDURE — 93018 CV STRESS TEST I&R ONLY: CPT | Performed by: INTERNAL MEDICINE

## 2025-05-08 PROCEDURE — 93017 CV STRESS TEST TRACING ONLY: CPT

## 2025-05-08 PROCEDURE — 93016 CV STRESS TEST SUPVJ ONLY: CPT | Performed by: INTERNAL MEDICINE

## 2025-05-13 ENCOUNTER — APPOINTMENT (OUTPATIENT)
Facility: CLINIC | Age: 65
End: 2025-05-13
Payer: MEDICARE

## 2025-05-13 VITALS — HEIGHT: 68 IN | WEIGHT: 156 LBS | BODY MASS INDEX: 23.64 KG/M2 | HEART RATE: 76 BPM

## 2025-05-13 DIAGNOSIS — K27.9 PUD (PEPTIC ULCER DISEASE): ICD-10-CM

## 2025-05-13 DIAGNOSIS — R10.13 EPIGASTRIC PAIN: Primary | ICD-10-CM

## 2025-05-13 DIAGNOSIS — Z95.1 S/P CABG (CORONARY ARTERY BYPASS GRAFT): ICD-10-CM

## 2025-05-13 DIAGNOSIS — Z86.0101 HX OF ADENOMATOUS COLONIC POLYPS: ICD-10-CM

## 2025-05-13 PROCEDURE — 3008F BODY MASS INDEX DOCD: CPT | Performed by: STUDENT IN AN ORGANIZED HEALTH CARE EDUCATION/TRAINING PROGRAM

## 2025-05-13 PROCEDURE — 99203 OFFICE O/P NEW LOW 30 MIN: CPT | Performed by: STUDENT IN AN ORGANIZED HEALTH CARE EDUCATION/TRAINING PROGRAM

## 2025-05-13 PROCEDURE — 1160F RVW MEDS BY RX/DR IN RCRD: CPT | Performed by: STUDENT IN AN ORGANIZED HEALTH CARE EDUCATION/TRAINING PROGRAM

## 2025-05-13 PROCEDURE — 1036F TOBACCO NON-USER: CPT | Performed by: STUDENT IN AN ORGANIZED HEALTH CARE EDUCATION/TRAINING PROGRAM

## 2025-05-13 PROCEDURE — 1159F MED LIST DOCD IN RCRD: CPT | Performed by: STUDENT IN AN ORGANIZED HEALTH CARE EDUCATION/TRAINING PROGRAM

## 2025-05-13 ASSESSMENT — ENCOUNTER SYMPTOMS
ABDOMINAL DISTENTION: 0
RECTAL PAIN: 0
CONSTIPATION: 0
BLOOD IN STOOL: 0
TROUBLE SWALLOWING: 0
NAUSEA: 0
FEVER: 0
ANAL BLEEDING: 0
CHILLS: 0
ABDOMINAL PAIN: 1
VOMITING: 0
UNEXPECTED WEIGHT CHANGE: 0
SHORTNESS OF BREATH: 0
DIARRHEA: 0
COLOR CHANGE: 0

## 2025-05-13 NOTE — Clinical Note
Hi , I scheduled this patient for egd/colon with me for eval of epigastric pain and polyp surveillance. I would like him to hold his plavix for 5 days before procedure if okay from your standpoint. I also saw his most recent stress test with borderline ischemic change, any contraindication to procedures Thank you Nataliia Moss

## 2025-05-13 NOTE — PROGRESS NOTES
"Chief Complaint:  Chief Complaint   Patient presents with    Abdominal Pain       1.5 -2.0 mo of sharp and burning epigstric pain. Eating helped the pain. Recently saw  for concern of biliary etiology who did not suspect biliary type pain and referred for EGD/Colonoscopy. Also saw his cardiologist who did stress test to r/o cardiac cause.     Was started on pantoprazole 20mg with improvement in symptoms.     Denies n/v/melena/hematochezia/change in weight    BM daily  Izard 4    Hx of peptic ulcer when younger.   No GI family hx     Hx of CABG with stents, last 3-4 years ago, on aspirin 81mg and plavix  Stress test with borderline ischemic changes but above avg functional capacity   Colonoscopy 2018 >1cm pedunculated polyp removed, two tubular adenomas, recommended 3 yrs repeat     Summary:   1. Borderline ischemic ECG changes (~1 mm) at submax workload (68% MPHR).   2. Above average functional capacity for age/gender at 10 METS.   3. Abnormal Stress Test.                  Review of Systems   Constitutional:  Negative for chills, fever and unexpected weight change.   HENT:  Negative for trouble swallowing.    Respiratory:  Negative for shortness of breath.    Cardiovascular:  Negative for chest pain.   Gastrointestinal:  Positive for abdominal pain. Negative for abdominal distention, anal bleeding, blood in stool, constipation, diarrhea, nausea, rectal pain and vomiting.   Skin:  Negative for color change.     Family History[1]    Medications  Current Medications[2]    Vitals  Pulse 76   Ht 1.727 m (5' 8\")   Wt 70.8 kg (156 lb)   BMI 23.72 kg/m²     Physical Exam  Constitutional:       General: He is not in acute distress.     Appearance: Normal appearance. He is not toxic-appearing.   HENT:      Head: Normocephalic.      Nose: Nose normal.   Eyes:      General: No scleral icterus.     Pupils: Pupils are equal, round, and reactive to light.   Cardiovascular:      Rate and Rhythm: Normal rate. " "  Pulmonary:      Effort: Pulmonary effort is normal. No respiratory distress.      Breath sounds: No wheezing.   Abdominal:      General: Abdomen is flat. Bowel sounds are normal. There is no distension.      Palpations: Abdomen is soft.      Tenderness: There is no abdominal tenderness. There is no guarding or rebound.   Skin:     General: Skin is warm.      Coloration: Skin is not jaundiced.   Neurological:      General: No focal deficit present.      Mental Status: He is alert.   Psychiatric:         Mood and Affect: Mood normal.           Labs:  No results found for: \"AFP\"No results found for: \"ASMAB\", \"MITOAB\"  Lab Results   Component Value Date    ALLEN POSITIVE (A) 10/29/2019   No results found for: \"ASMAB\", \"MITOAB\"No results found for: \"MAUVTDNG73\"  Lab Results   Component Value Date    HEPCAB NON-REACTIVE 04/18/2018     Lab Results   Component Value Date    HEPBCAB NONREACTIVE 04/18/2018    HEPCAB NON-REACTIVE 04/18/2018     Lab Results   Component Value Date    HIV1X2 NON REACTIVE 04/18/2018   No results found for: \"IRON\", \"TIBC\", \"FERRITIN\"  Lab Results   Component Value Date    INR 1.0 04/19/2024    INR 1.0 10/26/2021    INR 1.0 12/17/2019    PROTIME 11.0 04/19/2024    PROTIME 12.1 10/26/2021    PROTIME 11.4 12/17/2019     Lab Results   Component Value Date    TSH 2.52 11/09/2022       Radiology  No image results found.      A/P   Kennedy was seen today for abdominal pain.  Diagnoses and all orders for this visit:  Epigastric pain (Primary)  -     Esophagogastroduodenoscopy (EGD); Future  PUD (peptic ulcer disease)  -     Referral to Gastroenterology  -     Esophagogastroduodenoscopy (EGD); Future  Hx of adenomatous colonic polyps  -     Colonoscopy Screening; High Risk Patient; hx of large polyp; Future  S/P CABG (coronary artery bypass graft)     Problem List Items Addressed This Visit           ICD-10-CM    S/P CABG (coronary artery bypass graft) Z95.1    Will discuss with cardiology if okay to hold " plavix 5 days prior to scopes         PUD (peptic ulcer disease) K27.9    Relevant Orders    Esophagogastroduodenoscopy (EGD)    Epigastric pain - Primary R10.13    1-2 mo of epigastric pain, on DAPT, hx of PUD remotely, Improved with PPI. Could be related to PUD, Erosive gastritis but will r/o mass or other etiology. Recommend EGD with bx for h.pylori given hx of PUD   - Recommend EGD for furthe eval  - R/B/A of procedure discussed w patient including but not limited to risk of bleeding, infection, perforation - patient agreeable to EGD   - cw ppi qd   - pre and post procedure instructions discussed           Relevant Orders    Esophagogastroduodenoscopy (EGD)    Hx of adenomatous colonic polyps Z86.0101    Two tubular adenomas on last scope 2018, one >1cm  - overdue for repeat  - R/B/A of procedure discussed w patient including but not limited to risk of bleeding, infection, missed polyps, perforation, larger polyps which can not be removed endoscopically or by myself, incomplete procedure due to looping or preparation; benefits to detect and remove pre-cancerous polyps   - patient agreeable to colonoscopy   - split dose M/G  - low fiber diet 3 days before   - hold fiber supplement 5 days before  - Hold plavix 5 days prior if cleared by cardiology, c/w aspirin  - pre and post procedure instructions discussed in detail by myself and  with hand outs provided          Relevant Orders    Colonoscopy Screening; High Risk Patient; hx of large polyp            [1]   Family History  Problem Relation Name Age of Onset    Diabetes Mother      Prostate cancer Father      Diabetes Mother's Brother      Heart attack Mother's Brother      Diabetes Maternal Grandfather     [2]   Current Outpatient Medications:     ALPRAZolam (Xanax) 0.25 mg tablet, Take 0.5 tablets (0.125 mg) by mouth 3 times a day as needed for anxiety., Disp: 42 tablet, Rfl: 0    amLODIPine (Norvasc) 2.5 mg tablet, Take 1 tablet (2.5 mg) by  mouth once daily., Disp: 90 tablet, Rfl: 3    clopidogrel (Plavix) 75 mg tablet, Take 1 tablet (75 mg) by mouth once daily., Disp: 90 tablet, Rfl: 3    ezetimibe (Zetia) 10 mg tablet, Take 1 tablet (10 mg) by mouth once daily at bedtime., Disp: 90 tablet, Rfl: 3    lisinopril 40 mg tablet, Take 1 tablet (40 mg) by mouth once daily., Disp: 90 tablet, Rfl: 3    LOW-DOSE ASPIRIN ORAL, Take 81 mg by mouth once daily., Disp: , Rfl:     metoprolol succinate XL (Toprol-XL) 50 mg 24 hr tablet, Take 1 tablet (50 mg) by mouth once daily. For CAD, Disp: 90 tablet, Rfl: 3    pantoprazole (ProtoNix) 20 mg EC tablet, Take 1 tablet (20 mg) by mouth once daily in the morning. Take before meals. Do not crush, chew, or split., Disp: 30 tablet, Rfl: 11    rosuvastatin (Crestor) 20 mg tablet, Take 1 tablet (20 mg) by mouth once daily., Disp: 90 tablet, Rfl: 3    famotidine (Pepcid) 20 mg tablet, Take 1 tablet (20 mg) by mouth 2 times a day., Disp: 60 tablet, Rfl: 0    nitroglycerin (Nitrostat) 0.4 mg SL tablet, Place 1 tablet (0.4 mg) under the tongue every 5 minutes if needed for chest pain for up to 25 days. PLACE 1 TABLET UNDER THE TONGUE EVERY 5 MINUTES FOR UP TO 3 DOSES AS NEEDED FOR CHEST PAIN.CALL 911 IF PAIN PERSISTS for chest pain, Disp: 90 tablet, Rfl: 2

## 2025-05-13 NOTE — ASSESSMENT & PLAN NOTE
1-2 mo of epigastric pain, on DAPT, hx of PUD remotely, Improved with PPI. Could be related to PUD, Erosive gastritis but will r/o mass or other etiology. Recommend EGD with bx for h.pylori given hx of PUD   - Recommend EGD for furthe eval  - R/B/A of procedure discussed w patient including but not limited to risk of bleeding, infection, perforation - patient agreeable to EGD   - cw ppi qd   - pre and post procedure instructions discussed

## 2025-05-13 NOTE — ASSESSMENT & PLAN NOTE
Two tubular adenomas on last scope 2018, one >1cm  - overdue for repeat  - R/B/A of procedure discussed w patient including but not limited to risk of bleeding, infection, missed polyps, perforation, larger polyps which can not be removed endoscopically or by myself, incomplete procedure due to looping or preparation; benefits to detect and remove pre-cancerous polyps   - patient agreeable to colonoscopy   - split dose M/G  - low fiber diet 3 days before   - hold fiber supplement 5 days before  - Hold plavix 5 days prior if cleared by cardiology, c/w aspirin  - pre and post procedure instructions discussed in detail by myself and  with hand outs provided

## 2025-05-13 NOTE — PATIENT INSTRUCTIONS
We will check [facility]  to make sure it is safe to hold Plavix for the 5 days before your EGD/Colonoscopy.   You should continue your aspirin daily.

## 2025-06-02 ENCOUNTER — APPOINTMENT (OUTPATIENT)
Dept: PRIMARY CARE | Facility: CLINIC | Age: 65
End: 2025-06-02
Payer: MEDICARE

## 2025-06-02 VITALS
DIASTOLIC BLOOD PRESSURE: 68 MMHG | HEART RATE: 56 BPM | OXYGEN SATURATION: 98 % | HEIGHT: 68 IN | BODY MASS INDEX: 23.07 KG/M2 | WEIGHT: 152.2 LBS | SYSTOLIC BLOOD PRESSURE: 135 MMHG | RESPIRATION RATE: 16 BRPM

## 2025-06-02 DIAGNOSIS — Z23 ENCOUNTER FOR IMMUNIZATION: ICD-10-CM

## 2025-06-02 DIAGNOSIS — E78.5 HYPERLIPIDEMIA, UNSPECIFIED HYPERLIPIDEMIA TYPE: ICD-10-CM

## 2025-06-02 DIAGNOSIS — R80.9 PROTEINURIA, UNSPECIFIED TYPE: ICD-10-CM

## 2025-06-02 DIAGNOSIS — Z12.5 PROSTATE CANCER SCREENING: ICD-10-CM

## 2025-06-02 DIAGNOSIS — Z00.00 WELCOME TO MEDICARE PREVENTIVE VISIT: Primary | ICD-10-CM

## 2025-06-02 DIAGNOSIS — R73.03 PREDIABETES: ICD-10-CM

## 2025-06-02 PROBLEM — R20.2 ARM PARESTHESIA, RIGHT: Status: RESOLVED | Noted: 2023-03-08 | Resolved: 2025-06-02

## 2025-06-02 PROBLEM — R53.83 FATIGUE: Status: RESOLVED | Noted: 2023-03-08 | Resolved: 2025-06-02

## 2025-06-02 PROBLEM — R09.89 LABILE HYPERTENSION: Status: RESOLVED | Noted: 2023-03-08 | Resolved: 2025-06-02

## 2025-06-02 PROCEDURE — 1159F MED LIST DOCD IN RCRD: CPT | Performed by: FAMILY MEDICINE

## 2025-06-02 PROCEDURE — 3075F SYST BP GE 130 - 139MM HG: CPT | Performed by: FAMILY MEDICINE

## 2025-06-02 PROCEDURE — 3078F DIAST BP <80 MM HG: CPT | Performed by: FAMILY MEDICINE

## 2025-06-02 PROCEDURE — 1036F TOBACCO NON-USER: CPT | Performed by: FAMILY MEDICINE

## 2025-06-02 PROCEDURE — 1160F RVW MEDS BY RX/DR IN RCRD: CPT | Performed by: FAMILY MEDICINE

## 2025-06-02 PROCEDURE — 90677 PCV20 VACCINE IM: CPT | Performed by: FAMILY MEDICINE

## 2025-06-02 PROCEDURE — 1170F FXNL STATUS ASSESSED: CPT | Performed by: FAMILY MEDICINE

## 2025-06-02 PROCEDURE — 3008F BODY MASS INDEX DOCD: CPT | Performed by: FAMILY MEDICINE

## 2025-06-02 PROCEDURE — G0009 ADMIN PNEUMOCOCCAL VACCINE: HCPCS | Performed by: FAMILY MEDICINE

## 2025-06-02 PROCEDURE — G0402 INITIAL PREVENTIVE EXAM: HCPCS | Performed by: FAMILY MEDICINE

## 2025-06-02 ASSESSMENT — ACTIVITIES OF DAILY LIVING (ADL)
BATHING: INDEPENDENT
GROCERY_SHOPPING: INDEPENDENT
TAKING_MEDICATION: INDEPENDENT
DRESSING: INDEPENDENT
MANAGING_FINANCES: INDEPENDENT
DOING_HOUSEWORK: INDEPENDENT

## 2025-06-02 ASSESSMENT — PATIENT HEALTH QUESTIONNAIRE - PHQ9
1. LITTLE INTEREST OR PLEASURE IN DOING THINGS: NOT AT ALL
SUM OF ALL RESPONSES TO PHQ9 QUESTIONS 1 AND 2: 0
2. FEELING DOWN, DEPRESSED OR HOPELESS: NOT AT ALL

## 2025-06-02 NOTE — PATIENT INSTRUCTIONS
Risk factors identified during visit:   None    Flu shot: Up to date.  MMR: Patient declined.  PPSV23: N/A.  PCV13: N/A.  PCV20: Ordered.  RSV: Up to date.  Shingrix: Up to date.  Colon cancer screening: Scheduled for 6/10/25.  AAA screening: N/A.  HIV screening: N/A.  Hepatitis C screening: Up to date.  Prostate cancer screening: Ordered.    Recommend living will, health care power of .    Fasting labs.  Follow up with specialists as directed.    F/U 1 year: Annual wellness visit.    Lab services: [address]  Hours: M-F 7:15a-6:00p  Phone: [phone], [address] 1

## 2025-06-02 NOTE — PROGRESS NOTES
Subjective   Reason for Visit: Kennedy Jama is an 65 y.o. male here for a Medicare Wellness visit.     Past Medical, Surgical, and Family History reviewed and updated in chart.    Reviewed all medications by prescribing practitioner or clinical pharmacist (such as prescriptions, OTCs, herbal therapies and supplements) and documented in the medical record.    HPI    Patient Self Assessment of Health Status  Patient Self Assessment: Fair    Nutrition and Exercise  Current Diet: Heart Healthy Diet  Adequate Fluid Intake: Yes  Caffeine: No  Exercise Frequency: Regularly    Functional Ability/Level of Safety  Cognitive Impairment Observed: No cognitive impairment observed  Cognitive Impairment Reported: No cognitive impairment reported by patient or family    Home Safety Risk Factors: None    Of note: All meds provided by specialists.    Medicare Wellness Billing Compliance Satisfied    *This is a visual tool to show completion of required items on the day of the visit. Green checks will only appear on the date of visit.    Review all medications by prescribing practitioner or clinical pharmacist (such as prescriptions, OTCs, herbal therapies and supplements) documented in the medical record    Past Medical, Surgical, and Family History reviewed and updated in chart    Tobacco Use Reviewed    Alcohol Use Reviewed    Illicit Drug Use Reviewed    PHQ2/9    Falls in Last Year Reviewed    Home Safety Risk Factors Reviewed    Cognitive Impairment Reviewed    Patient Self Assessment and Health Status    Current Diet Reviewed    Exercise Frequency    ADL - Hearing Impairment    ADL - Bathing    ADL - Dressing    ADL - Walks in Home    IADL - Managing Finances    IADL - Grocery Shopping    IADL - Taking Medications    IADL - Doing Housework      Patient Care Team:  Genaro Gregg MD as PCP - General (Family Medicine)  Hay Powell MD as Consulting Physician (Cardiology)     Review of Systems  No  "other complaints.     Objective   Vitals:  /68   Pulse 56   Resp 16   Ht 1.727 m (5' 8\")   Wt 69 kg (152 lb 3.2 oz)   SpO2 98%   BMI 23.14 kg/m²       Physical Exam  Constitutional:       General: He is not in acute distress.     Appearance: He is normal weight.   Musculoskeletal:      Comments: Ambulating w/o assistance   Neurological:      Mental Status: He is oriented to person, place, and time.   Psychiatric:         Mood and Affect: Mood normal.         Behavior: Behavior normal.     Assessment/Plan   Diagnoses and all orders for this visit:  Welcome to Medicare preventive visit  Hyperlipidemia, unspecified hyperlipidemia type  -     Lipid Panel; Future  Prediabetes  -     Hemoglobin A1C; Future  Proteinuria, unspecified type  -     Albumin-Creatinine Ratio, Urine Random; Future  Prostate cancer screening  -     Prostate Specific Antigen, Screen; Future  Encounter for immunization  -     Pneumococcal conjugate vaccine, 20-valent (PREVNAR 20)    Risk factors identified during visit:   None    Flu shot: Up to date.  MMR: Patient declined.  PPSV23: N/A.  PCV13: N/A.  PCV20: Ordered.  RSV: Up to date.  Shingrix: Up to date.  Colon cancer screening: Scheduled for 6/10/25.  AAA screening: N/A.  HIV screening: N/A.  Hepatitis C screening: Up to date.  Prostate cancer screening: Ordered.    Recommend living will, health care power of .    Fasting labs.  Follow up with specialists as directed.    F/U 1 year: Annual wellness visit.  "

## 2025-06-03 LAB
ALBUMIN/CREAT UR: 1065 MG/G CREAT
CHOLEST SERPL-MCNC: 127 MG/DL
CHOLEST/HDLC SERPL: 3.3 (CALC)
CREAT UR-MCNC: 26 MG/DL (ref 20–320)
EST. AVERAGE GLUCOSE BLD GHB EST-MCNC: 128 MG/DL
EST. AVERAGE GLUCOSE BLD GHB EST-SCNC: 7.1 MMOL/L
HBA1C MFR BLD: 6.1 %
HDLC SERPL-MCNC: 39 MG/DL
LDLC SERPL CALC-MCNC: 67 MG/DL (CALC)
MICROALBUMIN UR-MCNC: 27.7 MG/DL
NONHDLC SERPL-MCNC: 88 MG/DL (CALC)
PSA SERPL-MCNC: 0.62 NG/ML
TRIGL SERPL-MCNC: 119 MG/DL

## 2025-06-04 ENCOUNTER — OFFICE VISIT (OUTPATIENT)
Dept: CARDIOLOGY | Facility: HOSPITAL | Age: 65
End: 2025-06-04
Payer: MEDICARE

## 2025-06-04 VITALS
DIASTOLIC BLOOD PRESSURE: 75 MMHG | BODY MASS INDEX: 23.19 KG/M2 | HEIGHT: 68 IN | HEART RATE: 60 BPM | WEIGHT: 153 LBS | SYSTOLIC BLOOD PRESSURE: 125 MMHG

## 2025-06-04 DIAGNOSIS — I25.10 CORONARY ARTERY DISEASE INVOLVING NATIVE CORONARY ARTERY OF NATIVE HEART WITHOUT ANGINA PECTORIS: Primary | ICD-10-CM

## 2025-06-04 PROCEDURE — 99212 OFFICE O/P EST SF 10 MIN: CPT

## 2025-06-04 PROCEDURE — 1159F MED LIST DOCD IN RCRD: CPT | Performed by: INTERNAL MEDICINE

## 2025-06-04 PROCEDURE — 3008F BODY MASS INDEX DOCD: CPT | Performed by: INTERNAL MEDICINE

## 2025-06-04 PROCEDURE — 99214 OFFICE O/P EST MOD 30 MIN: CPT | Performed by: INTERNAL MEDICINE

## 2025-06-04 PROCEDURE — 1036F TOBACCO NON-USER: CPT | Performed by: INTERNAL MEDICINE

## 2025-06-04 PROCEDURE — 3078F DIAST BP <80 MM HG: CPT | Performed by: INTERNAL MEDICINE

## 2025-06-04 PROCEDURE — 3074F SYST BP LT 130 MM HG: CPT | Performed by: INTERNAL MEDICINE

## 2025-06-04 PROCEDURE — 1160F RVW MEDS BY RX/DR IN RCRD: CPT | Performed by: INTERNAL MEDICINE

## 2025-06-04 NOTE — PROGRESS NOTES
Subjective:  Kennedy returns for a routine follow-up.  I was pleased to see that his chest discomfort has largely resolved.  He may get some limited tightness if he pushes himself extremely hard but he generally is tolerating a good activity level without problems.  He  did reasonably well on his stress test without symptoms at a good activity level.  He had only borderline EKG changes.    He is anxiously looking forward to going to [facility].  He remains compliant with all of his medications and is tolerating them well.    Objective:  General: Alert, usual pleasant self.  HEENT: Unchanged.  Lungs: Clear without crackles.  Cardiac: Normal S1 and S2 with soft systolic murmur.  Abdomen: Nontender with normal bowel sounds.  Extremities: No edema.  Skin: No acute rash.  Neuro: Grossly intact and unchanged.    Lipid panel: Cholesterol-127, HDL-39, LDL-67, TG-119.    Impression/plan:  Kennedy generally remains reasonably stable clinically at this time despite his extensive and complex coronary disease history.  He knows to alert me for any escalating anginal type symptoms.  I do think he is fine to go to [facility] as planned.    His heart rate and blood pressure remain under excellent control, so we will continue his amlodipine, lisinopril, and metoprolol unchanged.    His lipid panel also looks excellent on combination therapy with rosuvastatin and ezetimibe.    I will see him back for routine follow-up in 4 months.  He knows to call for any intercurrent problems before then.    Patient instructions:    Continue current medications unchanged.    Return to clinic in 4 months.

## 2025-06-10 ENCOUNTER — APPOINTMENT (OUTPATIENT)
Dept: GASTROENTEROLOGY | Facility: EXTERNAL LOCATION | Age: 65
End: 2025-06-10
Payer: MEDICARE

## 2025-09-23 ENCOUNTER — APPOINTMENT (OUTPATIENT)
Dept: GASTROENTEROLOGY | Facility: EXTERNAL LOCATION | Age: 65
End: 2025-09-23
Payer: MEDICARE

## (undated) DEVICE — GUIDEWIRE, INQWIRE, 3MM J, .035, 260

## (undated) DEVICE — SHEATH, PINNACLE, 10 CM,  4FR INTRODUCER, 4FR DIA, 2.5 CM DIALATOR

## (undated) DEVICE — INTRODUCER, MICRO, 4FR, 7CM ECHO

## (undated) DEVICE — CATHETER, DIAGNOSTIC, 4FR-IM

## (undated) DEVICE — GUIDEWIRE, INQWIRE, 3MM J, .035, 150

## (undated) DEVICE — CATHETER, DIAGNOSTIC, 4 FR-JL 4.5

## (undated) DEVICE — CATHETER, DIAGNOSTIC, 4FR-MPA2

## (undated) DEVICE — CATHETER, PIGTAIL 155 MOD DIAGNOSTIC, 4 FR (110 CM)

## (undated) DEVICE — CATHETER, DIAGNOSTIC, 4 FR-JR 4